# Patient Record
Sex: FEMALE | Race: WHITE | NOT HISPANIC OR LATINO | Employment: OTHER | URBAN - METROPOLITAN AREA
[De-identification: names, ages, dates, MRNs, and addresses within clinical notes are randomized per-mention and may not be internally consistent; named-entity substitution may affect disease eponyms.]

---

## 2017-01-27 ENCOUNTER — OFFICE VISIT - HEALTHEAST (OUTPATIENT)
Dept: GERIATRICS | Facility: CLINIC | Age: 82
End: 2017-01-27

## 2017-01-27 DIAGNOSIS — J18.9 CAP (COMMUNITY ACQUIRED PNEUMONIA): ICD-10-CM

## 2017-01-27 DIAGNOSIS — G93.40 ENCEPHALOPATHY: ICD-10-CM

## 2017-01-27 DIAGNOSIS — H91.93 HEARING LOSS, BILATERAL: ICD-10-CM

## 2017-01-27 DIAGNOSIS — I26.99 PULMONARY EMBOLISM AND INFARCTION (H): ICD-10-CM

## 2017-01-27 DIAGNOSIS — J10.1 INFLUENZA B: ICD-10-CM

## 2017-01-27 DIAGNOSIS — Q85.01: ICD-10-CM

## 2017-01-27 DIAGNOSIS — I26.99 PULMONARY INFARCT (H): ICD-10-CM

## 2017-01-27 DIAGNOSIS — I25.10 ATHEROSCLEROSIS OF NATIVE CORONARY ARTERY OF NATIVE HEART WITHOUT ANGINA PECTORIS: ICD-10-CM

## 2017-01-27 DIAGNOSIS — G40.209 PARTIAL SYMPTOMATIC EPILEPSY WITH COMPLEX PARTIAL SEIZURES, NOT INTRACTABLE, WITHOUT STATUS EPILEPTICUS (H): ICD-10-CM

## 2017-01-31 ENCOUNTER — OFFICE VISIT - HEALTHEAST (OUTPATIENT)
Dept: GERIATRICS | Facility: CLINIC | Age: 82
End: 2017-01-31

## 2017-01-31 DIAGNOSIS — J18.9 CAP (COMMUNITY ACQUIRED PNEUMONIA): ICD-10-CM

## 2017-01-31 DIAGNOSIS — I25.10 ATHEROSCLEROSIS OF NATIVE CORONARY ARTERY OF NATIVE HEART WITHOUT ANGINA PECTORIS: ICD-10-CM

## 2017-01-31 DIAGNOSIS — Q85.01: ICD-10-CM

## 2017-01-31 DIAGNOSIS — F03.90 DEMENTIA (H): ICD-10-CM

## 2017-01-31 DIAGNOSIS — G40.209 PARTIAL SYMPTOMATIC EPILEPSY WITH COMPLEX PARTIAL SEIZURES, NOT INTRACTABLE, WITHOUT STATUS EPILEPTICUS (H): ICD-10-CM

## 2017-01-31 DIAGNOSIS — J10.1 INFLUENZA B: ICD-10-CM

## 2017-01-31 DIAGNOSIS — I26.99 PULMONARY EMBOLISM AND INFARCTION (H): ICD-10-CM

## 2017-01-31 DIAGNOSIS — F03.90 DEMENTIA WITHOUT BEHAVIORAL DISTURBANCE, UNSPECIFIED DEMENTIA TYPE: ICD-10-CM

## 2017-01-31 DIAGNOSIS — R53.1 GENERALIZED WEAKNESS: ICD-10-CM

## 2017-01-31 DIAGNOSIS — I26.99 PULMONARY INFARCT (H): ICD-10-CM

## 2017-02-03 ENCOUNTER — OFFICE VISIT - HEALTHEAST (OUTPATIENT)
Dept: GERIATRICS | Facility: CLINIC | Age: 82
End: 2017-02-03

## 2017-02-03 DIAGNOSIS — I25.10 ATHEROSCLEROSIS OF NATIVE CORONARY ARTERY OF NATIVE HEART WITHOUT ANGINA PECTORIS: ICD-10-CM

## 2017-02-03 DIAGNOSIS — H91.93 HEARING LOSS, BILATERAL: ICD-10-CM

## 2017-02-03 DIAGNOSIS — J10.1 INFLUENZA B: ICD-10-CM

## 2017-02-03 DIAGNOSIS — C72.30 OPTIC NERVE GLIOMA (H): ICD-10-CM

## 2017-02-03 DIAGNOSIS — J18.9 CAP (COMMUNITY ACQUIRED PNEUMONIA): ICD-10-CM

## 2017-02-03 DIAGNOSIS — F03.90 DEMENTIA WITHOUT BEHAVIORAL DISTURBANCE, UNSPECIFIED DEMENTIA TYPE: ICD-10-CM

## 2017-02-03 DIAGNOSIS — N39.0 URINARY TRACT INFECTION: ICD-10-CM

## 2017-02-03 DIAGNOSIS — Q85.01: ICD-10-CM

## 2017-02-03 DIAGNOSIS — R53.1 GENERALIZED WEAKNESS: ICD-10-CM

## 2017-02-03 DIAGNOSIS — G40.209 PARTIAL SYMPTOMATIC EPILEPSY WITH COMPLEX PARTIAL SEIZURES, NOT INTRACTABLE, WITHOUT STATUS EPILEPTICUS (H): ICD-10-CM

## 2017-02-03 DIAGNOSIS — N39.0 URINARY TRACT INFECTION WITHOUT HEMATURIA, SITE UNSPECIFIED: ICD-10-CM

## 2017-02-03 DIAGNOSIS — I26.99 PULMONARY EMBOLISM AND INFARCTION (H): ICD-10-CM

## 2017-02-07 ENCOUNTER — OFFICE VISIT - HEALTHEAST (OUTPATIENT)
Dept: GERIATRICS | Facility: CLINIC | Age: 82
End: 2017-02-07

## 2017-02-07 DIAGNOSIS — G40.209 PARTIAL SYMPTOMATIC EPILEPSY WITH COMPLEX PARTIAL SEIZURES, NOT INTRACTABLE, WITHOUT STATUS EPILEPTICUS (H): ICD-10-CM

## 2017-02-07 DIAGNOSIS — J10.1 INFLUENZA B: ICD-10-CM

## 2017-02-07 DIAGNOSIS — N39.0 URINARY TRACT INFECTION WITHOUT HEMATURIA, SITE UNSPECIFIED: ICD-10-CM

## 2017-02-07 DIAGNOSIS — F03.90 DEMENTIA WITHOUT BEHAVIORAL DISTURBANCE, UNSPECIFIED DEMENTIA TYPE: ICD-10-CM

## 2017-02-07 DIAGNOSIS — I25.10 ATHEROSCLEROSIS OF NATIVE CORONARY ARTERY OF NATIVE HEART WITHOUT ANGINA PECTORIS: ICD-10-CM

## 2017-02-07 DIAGNOSIS — I26.99 PULMONARY EMBOLISM AND INFARCTION (H): ICD-10-CM

## 2017-02-07 DIAGNOSIS — R53.1 GENERALIZED WEAKNESS: ICD-10-CM

## 2017-02-07 DIAGNOSIS — Q85.01: ICD-10-CM

## 2017-02-07 DIAGNOSIS — J18.9 CAP (COMMUNITY ACQUIRED PNEUMONIA): ICD-10-CM

## 2017-02-09 ENCOUNTER — AMBULATORY - HEALTHEAST (OUTPATIENT)
Dept: GERIATRICS | Facility: CLINIC | Age: 82
End: 2017-02-09

## 2017-02-20 ENCOUNTER — HOME CARE/HOSPICE - HEALTHEAST (OUTPATIENT)
Dept: HOME HEALTH SERVICES | Facility: HOME HEALTH | Age: 82
End: 2017-02-20

## 2017-02-23 ENCOUNTER — HOME CARE/HOSPICE - HEALTHEAST (OUTPATIENT)
Dept: HOME HEALTH SERVICES | Facility: HOME HEALTH | Age: 82
End: 2017-02-23

## 2017-02-24 ENCOUNTER — HOME CARE/HOSPICE - HEALTHEAST (OUTPATIENT)
Dept: HOME HEALTH SERVICES | Facility: HOME HEALTH | Age: 82
End: 2017-02-24

## 2017-02-27 ENCOUNTER — HOME CARE/HOSPICE - HEALTHEAST (OUTPATIENT)
Dept: HOME HEALTH SERVICES | Facility: HOME HEALTH | Age: 82
End: 2017-02-27

## 2017-02-28 ENCOUNTER — HOME CARE/HOSPICE - HEALTHEAST (OUTPATIENT)
Dept: HOME HEALTH SERVICES | Facility: HOME HEALTH | Age: 82
End: 2017-02-28

## 2017-03-01 ENCOUNTER — HOME CARE/HOSPICE - HEALTHEAST (OUTPATIENT)
Dept: HOME HEALTH SERVICES | Facility: HOME HEALTH | Age: 82
End: 2017-03-01

## 2017-03-02 ENCOUNTER — HOME CARE/HOSPICE - HEALTHEAST (OUTPATIENT)
Dept: HOME HEALTH SERVICES | Facility: HOME HEALTH | Age: 82
End: 2017-03-02

## 2017-03-06 ENCOUNTER — HOME CARE/HOSPICE - HEALTHEAST (OUTPATIENT)
Dept: HOME HEALTH SERVICES | Facility: HOME HEALTH | Age: 82
End: 2017-03-06

## 2017-03-07 ENCOUNTER — HOME CARE/HOSPICE - HEALTHEAST (OUTPATIENT)
Dept: HOME HEALTH SERVICES | Facility: HOME HEALTH | Age: 82
End: 2017-03-07

## 2017-03-09 ENCOUNTER — HOME CARE/HOSPICE - HEALTHEAST (OUTPATIENT)
Dept: HOME HEALTH SERVICES | Facility: HOME HEALTH | Age: 82
End: 2017-03-09

## 2017-03-12 ENCOUNTER — COMMUNICATION - HEALTHEAST (OUTPATIENT)
Dept: CARDIOLOGY | Facility: CLINIC | Age: 82
End: 2017-03-12

## 2017-03-12 DIAGNOSIS — I25.10 CORONARY ATHEROSCLEROSIS OF NATIVE CORONARY ARTERY: ICD-10-CM

## 2017-03-13 ENCOUNTER — HOME CARE/HOSPICE - HEALTHEAST (OUTPATIENT)
Dept: HOME HEALTH SERVICES | Facility: HOME HEALTH | Age: 82
End: 2017-03-13

## 2017-03-14 ENCOUNTER — HOME CARE/HOSPICE - HEALTHEAST (OUTPATIENT)
Dept: HOME HEALTH SERVICES | Facility: HOME HEALTH | Age: 82
End: 2017-03-14

## 2017-03-15 ENCOUNTER — HOME CARE/HOSPICE - HEALTHEAST (OUTPATIENT)
Dept: HOME HEALTH SERVICES | Facility: HOME HEALTH | Age: 82
End: 2017-03-15

## 2017-03-16 ENCOUNTER — HOME CARE/HOSPICE - HEALTHEAST (OUTPATIENT)
Dept: HOME HEALTH SERVICES | Facility: HOME HEALTH | Age: 82
End: 2017-03-16

## 2017-03-19 ENCOUNTER — HOME CARE/HOSPICE - HEALTHEAST (OUTPATIENT)
Dept: HOME HEALTH SERVICES | Facility: HOME HEALTH | Age: 82
End: 2017-03-19

## 2017-03-20 ENCOUNTER — HOME CARE/HOSPICE - HEALTHEAST (OUTPATIENT)
Dept: HOME HEALTH SERVICES | Facility: HOME HEALTH | Age: 82
End: 2017-03-20

## 2017-03-23 ENCOUNTER — HOME CARE/HOSPICE - HEALTHEAST (OUTPATIENT)
Dept: HOME HEALTH SERVICES | Facility: HOME HEALTH | Age: 82
End: 2017-03-23

## 2017-04-13 ENCOUNTER — RECORDS - HEALTHEAST (OUTPATIENT)
Dept: ADMINISTRATIVE | Facility: OTHER | Age: 82
End: 2017-04-13

## 2017-04-24 ENCOUNTER — COMMUNICATION - HEALTHEAST (OUTPATIENT)
Dept: CARDIOLOGY | Facility: CLINIC | Age: 82
End: 2017-04-24

## 2017-04-24 DIAGNOSIS — E78.5 HYPERLIPIDEMIA: ICD-10-CM

## 2017-05-25 ENCOUNTER — COMMUNICATION - HEALTHEAST (OUTPATIENT)
Dept: ADMINISTRATIVE | Facility: CLINIC | Age: 82
End: 2017-05-25

## 2017-06-25 ENCOUNTER — COMMUNICATION - HEALTHEAST (OUTPATIENT)
Dept: CARDIOLOGY | Facility: CLINIC | Age: 82
End: 2017-06-25

## 2017-06-25 DIAGNOSIS — I25.10 CORONARY ATHEROSCLEROSIS OF NATIVE CORONARY ARTERY: ICD-10-CM

## 2017-08-28 ENCOUNTER — COMMUNICATION - HEALTHEAST (OUTPATIENT)
Dept: CARDIOLOGY | Facility: CLINIC | Age: 82
End: 2017-08-28

## 2017-08-28 DIAGNOSIS — E87.6 HYPOKALEMIA: ICD-10-CM

## 2017-08-29 ENCOUNTER — RECORDS - HEALTHEAST (OUTPATIENT)
Dept: ADMINISTRATIVE | Facility: OTHER | Age: 82
End: 2017-08-29

## 2017-09-05 ENCOUNTER — AMBULATORY - HEALTHEAST (OUTPATIENT)
Dept: CARDIOLOGY | Facility: CLINIC | Age: 82
End: 2017-09-05

## 2017-09-05 ENCOUNTER — OFFICE VISIT - HEALTHEAST (OUTPATIENT)
Dept: CARDIOLOGY | Facility: CLINIC | Age: 82
End: 2017-09-05

## 2017-09-05 DIAGNOSIS — I26.99 PULMONARY EMBOLUS AND INFARCTION (H): ICD-10-CM

## 2017-09-05 DIAGNOSIS — E78.5 DYSLIPIDEMIA: ICD-10-CM

## 2017-09-05 DIAGNOSIS — R06.00 DYSPNEA: ICD-10-CM

## 2017-09-05 DIAGNOSIS — I25.10 CAD (CORONARY ARTERY DISEASE): ICD-10-CM

## 2017-09-05 ASSESSMENT — MIFFLIN-ST. JEOR: SCORE: 1090.86

## 2017-09-06 ENCOUNTER — COMMUNICATION - HEALTHEAST (OUTPATIENT)
Dept: CARDIOLOGY | Facility: CLINIC | Age: 82
End: 2017-09-06

## 2017-09-06 ENCOUNTER — HOSPITAL ENCOUNTER (OUTPATIENT)
Dept: LAB | Age: 82
Setting detail: SPECIMEN
Discharge: HOME | End: 2017-09-06

## 2017-09-06 DIAGNOSIS — I25.10 CORONARY ATHEROSCLEROSIS OF NATIVE CORONARY ARTERY: ICD-10-CM

## 2017-09-07 ENCOUNTER — HOSPITAL ENCOUNTER (OUTPATIENT)
Dept: LAB | Age: 82
Setting detail: SPECIMEN
Discharge: HOME | End: 2017-09-07

## 2017-09-13 ENCOUNTER — AMBULATORY - HEALTHEAST (OUTPATIENT)
Dept: CARDIOLOGY | Facility: CLINIC | Age: 82
End: 2017-09-13

## 2017-09-15 ENCOUNTER — HOSPITAL ENCOUNTER (OUTPATIENT)
Dept: CARDIOLOGY | Facility: HOSPITAL | Age: 82
Discharge: HOME | End: 2017-09-15
Attending: INTERNAL MEDICINE

## 2017-09-15 DIAGNOSIS — I25.10 CAD (CORONARY ARTERY DISEASE): ICD-10-CM

## 2017-09-15 LAB
AORTIC ROOT: 2.6 CM
BSA FOR ECHO PROCEDURE: 1.76 M2
CV ECHO HEIGHT: 62 IN
CV ECHO WEIGHT: 156 LBS
DOP CALC LVOT AREA: 3.14 CM2
DOP CALC LVOT DIAMETER: 2 CM
DOP CALC LVOT STROKE VOLUME: 50.9 CM3
DOP CALCLVOT PEAK VEL VTI: 16.2 CM
EJECTION FRACTION: 58 % (ref 55–75)
FRACTIONAL SHORTENING: 27.9 % (ref 28–44)
INTERVENTRICULAR SEPTUM IN END DIASTOLE: 0.9 CM (ref 0.6–0.9)
IVS/PW RATIO: 1.1
LA AREA 1: 16.3 CM2
LA AREA 2: 13.8 CM2
LEFT ATRIUM LENGTH: 4.59 CM
LEFT ATRIUM SIZE: 3.9 CM
LEFT ATRIUM TO AORTIC ROOT RATIO: 1.5 NO UNITS
LEFT ATRIUM VOLUME INDEX: 23.7 ML/M2
LEFT ATRIUM VOLUME: 41.7 CM3
LEFT VENTRICLE DIASTOLIC VOLUME INDEX: 21.6 CM3/M2 (ref 34–74)
LEFT VENTRICLE DIASTOLIC VOLUME: 38 CM3 (ref 46–106)
LEFT VENTRICLE MASS INDEX: 64.9 G/M2
LEFT VENTRICLE SYSTOLIC VOLUME INDEX: 9.1 CM3/M2 (ref 11–31)
LEFT VENTRICLE SYSTOLIC VOLUME: 16 CM3 (ref 14–42)
LEFT VENTRICULAR INTERNAL DIMENSION IN DIASTOLE: 4.3 CM (ref 3.8–5.2)
LEFT VENTRICULAR INTERNAL DIMENSION IN SYSTOLE: 3.1 CM (ref 2.2–3.5)
LEFT VENTRICULAR MASS: 114.2 G
LEFT VENTRICULAR OUTFLOW TRACT MEAN GRADIENT: 1 MMHG
LEFT VENTRICULAR OUTFLOW TRACT MEAN VELOCITY: 44.8 CM/S
LEFT VENTRICULAR POSTERIOR WALL IN END DIASTOLE: 0.8 CM (ref 0.6–0.9)
LV STROKE VOLUME INDEX: 28.9 ML/M2
MITRAL REGURGITANT VELOCITY TIME INTEGRAL: 146 CM
MITRAL VALVE E/A RATIO: 1.1
MR FLOW: 16 CM3
MR MEAN GRADIENT: 47 MMHG
MR MEAN GRADIENT: 47 MMHG
MR MEAN VELOCITY: 311 CM/S
MR MEAN VELOCITY: 311 CM/S
MR PEAK GRADIENT: 98.8 MMHG
MR PISA EROA: 0.1 CM2
MR PISA RADIUS: 0.5 CM
MR PISA VN NYQUIST: 33.5 CM/S
MV AVERAGE E/E' RATIO: 11.3 CM/S
MV DECELERATION TIME: 197 MS
MV E'TISSUE VEL-LAT: 9.26 CM/S
MV E'TISSUE VEL-MED: 6.43 CM/S
MV LATERAL E/E' RATIO: 9.5
MV MEDIAL E/E' RATIO: 13.7
MV PEAK A VELOCITY: 81.7 CM/S
MV PEAK E VELOCITY: 88.3 CM/S
MV REGURGITANT VOLUME: 15.5 CC
NUC REST DIASTOLIC VOLUME INDEX: 2496 LBS
NUC REST SYSTOLIC VOLUME INDEX: 62 IN
PISA MR PEAK VEL: 497 CM/S
PR MAX PG: 4 MMHG
PR PEAK VELOCITY: 123 CM/S
TRICUSPID REGURGITATION PEAK PRESSURE GRADIENT: 25.2 MMHG
TRICUSPID VALVE PEAK REGURGITANT VELOCITY: 251 CM/S

## 2017-09-15 ASSESSMENT — MIFFLIN-ST. JEOR: SCORE: 1090.86

## 2017-10-02 ENCOUNTER — COMMUNICATION - HEALTHEAST (OUTPATIENT)
Dept: CARDIOLOGY | Facility: CLINIC | Age: 82
End: 2017-10-02

## 2017-10-02 DIAGNOSIS — I25.10 CORONARY ATHEROSCLEROSIS OF NATIVE CORONARY ARTERY: ICD-10-CM

## 2017-12-08 ENCOUNTER — COMMUNICATION - HEALTHEAST (OUTPATIENT)
Dept: CARDIOLOGY | Facility: CLINIC | Age: 82
End: 2017-12-08

## 2017-12-08 DIAGNOSIS — I25.10 CORONARY ATHEROSCLEROSIS OF NATIVE CORONARY ARTERY: ICD-10-CM

## 2017-12-18 ENCOUNTER — RECORDS - HEALTHEAST (OUTPATIENT)
Dept: LAB | Facility: HOSPITAL | Age: 82
End: 2017-12-18

## 2017-12-19 LAB — SYPHILIS RPR SCREEN - HISTORICAL: NORMAL

## 2018-01-12 ENCOUNTER — COMMUNICATION - HEALTHEAST (OUTPATIENT)
Dept: CARDIOLOGY | Facility: CLINIC | Age: 83
End: 2018-01-12

## 2018-01-12 DIAGNOSIS — E78.5 HYPERLIPIDEMIA: ICD-10-CM

## 2018-02-27 ENCOUNTER — RECORDS - HEALTHEAST (OUTPATIENT)
Dept: LAB | Facility: HOSPITAL | Age: 83
End: 2018-02-27

## 2018-02-27 LAB
ALBUMIN SERPL-MCNC: 3.7 G/DL (ref 3.5–5)
ALP SERPL-CCNC: 62 U/L (ref 45–120)
ALT SERPL W P-5'-P-CCNC: 21 U/L (ref 0–45)
AST SERPL W P-5'-P-CCNC: 24 U/L (ref 0–40)
BILIRUB DIRECT SERPL-MCNC: 0.3 MG/DL
BILIRUB SERPL-MCNC: 0.9 MG/DL (ref 0–1)
PROT SERPL-MCNC: 7 G/DL (ref 6–8)

## 2018-03-02 ENCOUNTER — COMMUNICATION - HEALTHEAST (OUTPATIENT)
Dept: CARDIOLOGY | Facility: CLINIC | Age: 83
End: 2018-03-02

## 2018-03-02 DIAGNOSIS — E87.6 HYPOKALEMIA: ICD-10-CM

## 2018-04-08 ENCOUNTER — RECORDS - HEALTHEAST (OUTPATIENT)
Dept: LAB | Facility: CLINIC | Age: 83
End: 2018-04-08

## 2018-04-11 LAB — BACTERIA SPEC CULT: ABNORMAL

## 2018-04-14 ENCOUNTER — COMMUNICATION - HEALTHEAST (OUTPATIENT)
Dept: CARDIOLOGY | Facility: CLINIC | Age: 83
End: 2018-04-14

## 2018-04-14 DIAGNOSIS — I25.10 CORONARY ATHEROSCLEROSIS OF NATIVE CORONARY ARTERY: ICD-10-CM

## 2018-04-17 ENCOUNTER — COMMUNICATION - HEALTHEAST (OUTPATIENT)
Dept: ADMINISTRATIVE | Facility: CLINIC | Age: 83
End: 2018-04-17

## 2018-05-22 ENCOUNTER — RECORDS - HEALTHEAST (OUTPATIENT)
Dept: LAB | Facility: HOSPITAL | Age: 83
End: 2018-05-22

## 2018-05-22 LAB
ALBUMIN SERPL-MCNC: 3.3 G/DL (ref 3.5–5)
ALP SERPL-CCNC: 54 U/L (ref 45–120)
ALT SERPL W P-5'-P-CCNC: 15 U/L (ref 0–45)
ANION GAP SERPL CALCULATED.3IONS-SCNC: 9 MMOL/L (ref 5–18)
AST SERPL W P-5'-P-CCNC: 18 U/L (ref 0–40)
BILIRUB DIRECT SERPL-MCNC: 0.3 MG/DL
BILIRUB SERPL-MCNC: 0.8 MG/DL (ref 0–1)
CHLORIDE BLD-SCNC: 110 MMOL/L (ref 98–107)
CO2 SERPL-SCNC: 23 MMOL/L (ref 22–31)
LEVETIRACETAM (KEPPRA): 22.3 UG/ML (ref 6–46)
POTASSIUM BLD-SCNC: 3.8 MMOL/L (ref 3.5–5)
PROT SERPL-MCNC: 6.5 G/DL (ref 6–8)
SODIUM SERPL-SCNC: 142 MMOL/L (ref 136–145)

## 2018-06-15 ENCOUNTER — COMMUNICATION - HEALTHEAST (OUTPATIENT)
Dept: CARDIOLOGY | Facility: CLINIC | Age: 83
End: 2018-06-15

## 2018-06-15 DIAGNOSIS — I25.10 CORONARY ATHEROSCLEROSIS OF NATIVE CORONARY ARTERY: ICD-10-CM

## 2018-07-19 ENCOUNTER — RECORDS - HEALTHEAST (OUTPATIENT)
Dept: LAB | Facility: CLINIC | Age: 83
End: 2018-07-19

## 2018-07-21 LAB — BACTERIA SPEC CULT: ABNORMAL

## 2018-07-27 ENCOUNTER — RECORDS - HEALTHEAST (OUTPATIENT)
Dept: ADMINISTRATIVE | Facility: OTHER | Age: 83
End: 2018-07-27

## 2018-07-27 ENCOUNTER — AMBULATORY - HEALTHEAST (OUTPATIENT)
Dept: CARDIOLOGY | Facility: CLINIC | Age: 83
End: 2018-07-27

## 2018-08-01 ENCOUNTER — OFFICE VISIT - HEALTHEAST (OUTPATIENT)
Dept: CARDIOLOGY | Facility: CLINIC | Age: 83
End: 2018-08-01

## 2018-08-01 ENCOUNTER — COMMUNICATION - HEALTHEAST (OUTPATIENT)
Dept: CARDIOLOGY | Facility: CLINIC | Age: 83
End: 2018-08-01

## 2018-08-01 DIAGNOSIS — I25.810 ATHEROSCLEROSIS OF CORONARY ARTERY BYPASS GRAFT OF NATIVE HEART WITHOUT ANGINA PECTORIS: ICD-10-CM

## 2018-08-01 DIAGNOSIS — F03.90 DEMENTIA WITHOUT BEHAVIORAL DISTURBANCE, UNSPECIFIED DEMENTIA TYPE: ICD-10-CM

## 2018-08-01 DIAGNOSIS — Q85.01: ICD-10-CM

## 2018-08-01 DIAGNOSIS — E78.5 DYSLIPIDEMIA: ICD-10-CM

## 2018-08-01 DIAGNOSIS — I25.10 CAD (CORONARY ARTERY DISEASE): ICD-10-CM

## 2018-08-01 DIAGNOSIS — I25.10 CORONARY ATHEROSCLEROSIS OF NATIVE CORONARY ARTERY: ICD-10-CM

## 2018-08-01 DIAGNOSIS — I26.99 PULMONARY EMBOLISM AND INFARCTION (H): ICD-10-CM

## 2018-08-01 LAB
ANION GAP SERPL CALCULATED.3IONS-SCNC: 6 MMOL/L (ref 5–18)
BUN SERPL-MCNC: 14 MG/DL (ref 8–28)
CALCIUM SERPL-MCNC: 9.6 MG/DL (ref 8.5–10.5)
CHLORIDE BLD-SCNC: 109 MMOL/L (ref 98–107)
CHOLEST SERPL-MCNC: 132 MG/DL
CO2 SERPL-SCNC: 26 MMOL/L (ref 22–31)
CREAT SERPL-MCNC: 0.62 MG/DL (ref 0.6–1.1)
FASTING STATUS PATIENT QL REPORTED: YES
GFR SERPL CREATININE-BSD FRML MDRD: >60 ML/MIN/1.73M2
GLUCOSE BLD-MCNC: 92 MG/DL (ref 70–125)
HDLC SERPL-MCNC: 34 MG/DL
LDLC SERPL CALC-MCNC: 70 MG/DL
POTASSIUM BLD-SCNC: 3.6 MMOL/L (ref 3.5–5)
SODIUM SERPL-SCNC: 141 MMOL/L (ref 136–145)
TRIGL SERPL-MCNC: 142 MG/DL

## 2018-08-01 ASSESSMENT — MIFFLIN-ST. JEOR: SCORE: 991.07

## 2018-08-02 ENCOUNTER — COMMUNICATION - HEALTHEAST (OUTPATIENT)
Dept: CARDIOLOGY | Facility: CLINIC | Age: 83
End: 2018-08-02

## 2018-09-09 ENCOUNTER — COMMUNICATION - HEALTHEAST (OUTPATIENT)
Dept: CARDIOLOGY | Facility: CLINIC | Age: 83
End: 2018-09-09

## 2018-09-09 DIAGNOSIS — E87.6 HYPOKALEMIA: ICD-10-CM

## 2018-10-17 ENCOUNTER — COMMUNICATION - HEALTHEAST (OUTPATIENT)
Dept: CARDIOLOGY | Facility: CLINIC | Age: 83
End: 2018-10-17

## 2018-10-17 DIAGNOSIS — E78.5 HYPERLIPIDEMIA: ICD-10-CM

## 2018-10-29 ENCOUNTER — COMMUNICATION - HEALTHEAST (OUTPATIENT)
Dept: CARDIOLOGY | Facility: CLINIC | Age: 83
End: 2018-10-29

## 2018-10-29 DIAGNOSIS — I25.10 CORONARY ATHEROSCLEROSIS OF NATIVE CORONARY ARTERY: ICD-10-CM

## 2018-11-08 ENCOUNTER — COMMUNICATION - HEALTHEAST (OUTPATIENT)
Dept: CARDIOLOGY | Facility: CLINIC | Age: 83
End: 2018-11-08

## 2018-11-08 DIAGNOSIS — I25.10 CORONARY ATHEROSCLEROSIS OF NATIVE CORONARY ARTERY: ICD-10-CM

## 2019-01-01 ENCOUNTER — COMMUNICATION - HEALTHEAST (OUTPATIENT)
Dept: CARDIOLOGY | Facility: CLINIC | Age: 84
End: 2019-01-01

## 2019-01-01 DIAGNOSIS — I25.10 CORONARY ATHEROSCLEROSIS OF NATIVE CORONARY ARTERY: ICD-10-CM

## 2019-03-08 ENCOUNTER — RECORDS - HEALTHEAST (OUTPATIENT)
Dept: ADMINISTRATIVE | Facility: OTHER | Age: 84
End: 2019-03-08

## 2019-03-14 ENCOUNTER — HOSPITAL ENCOUNTER (OUTPATIENT)
Dept: LAB | Age: 84
Setting detail: SPECIMEN
Discharge: HOME | End: 2019-03-14

## 2019-03-14 ENCOUNTER — OFFICE VISIT - HEALTHEAST (OUTPATIENT)
Dept: CARDIOLOGY | Facility: CLINIC | Age: 84
End: 2019-03-14

## 2019-03-14 DIAGNOSIS — R53.83 FATIGUE: ICD-10-CM

## 2019-03-14 DIAGNOSIS — I25.10 CORONARY ATHEROSCLEROSIS OF NATIVE CORONARY ARTERY: ICD-10-CM

## 2019-03-14 DIAGNOSIS — F03.90 DEMENTIA (H): ICD-10-CM

## 2019-03-14 LAB
ANION GAP SERPL CALCULATED.3IONS-SCNC: 9 MMOL/L (ref 5–18)
BUN SERPL-MCNC: 14 MG/DL (ref 8–28)
CALCIUM SERPL-MCNC: 9.8 MG/DL (ref 8.5–10.5)
CHLORIDE BLD-SCNC: 106 MMOL/L (ref 98–107)
CO2 SERPL-SCNC: 25 MMOL/L (ref 22–31)
CREAT SERPL-MCNC: 0.69 MG/DL (ref 0.6–1.1)
GFR SERPL CREATININE-BSD FRML MDRD: >60 ML/MIN/1.73M2
GLUCOSE BLD-MCNC: 111 MG/DL (ref 70–125)
POTASSIUM BLD-SCNC: 3.7 MMOL/L (ref 3.5–5)
SODIUM SERPL-SCNC: 140 MMOL/L (ref 136–145)
TSH SERPL DL<=0.005 MIU/L-ACNC: 2.6 UIU/ML (ref 0.3–5)

## 2019-03-14 ASSESSMENT — MIFFLIN-ST. JEOR: SCORE: 918.49

## 2019-03-15 ENCOUNTER — COMMUNICATION - HEALTHEAST (OUTPATIENT)
Dept: CARDIOLOGY | Facility: CLINIC | Age: 84
End: 2019-03-15

## 2019-03-16 ENCOUNTER — COMMUNICATION - HEALTHEAST (OUTPATIENT)
Dept: CARDIOLOGY | Facility: CLINIC | Age: 84
End: 2019-03-16

## 2019-03-16 DIAGNOSIS — I25.10 CORONARY ATHEROSCLEROSIS OF NATIVE CORONARY ARTERY: ICD-10-CM

## 2019-03-27 ENCOUNTER — COMMUNICATION - HEALTHEAST (OUTPATIENT)
Dept: CARDIOLOGY | Facility: CLINIC | Age: 84
End: 2019-03-27

## 2019-03-27 DIAGNOSIS — I25.10 CORONARY ATHEROSCLEROSIS OF NATIVE CORONARY ARTERY: ICD-10-CM

## 2019-05-17 ENCOUNTER — RECORDS - HEALTHEAST (OUTPATIENT)
Dept: ADMINISTRATIVE | Facility: OTHER | Age: 84
End: 2019-05-17

## 2019-06-26 ENCOUNTER — COMMUNICATION - HEALTHEAST (OUTPATIENT)
Dept: CARDIOLOGY | Facility: CLINIC | Age: 84
End: 2019-06-26

## 2019-06-26 DIAGNOSIS — E87.6 HYPOKALEMIA: ICD-10-CM

## 2019-07-23 ENCOUNTER — COMMUNICATION - HEALTHEAST (OUTPATIENT)
Dept: CARDIOLOGY | Facility: CLINIC | Age: 84
End: 2019-07-23

## 2019-07-23 DIAGNOSIS — E78.5 HYPERLIPIDEMIA: ICD-10-CM

## 2019-09-17 ENCOUNTER — COMMUNICATION - HEALTHEAST (OUTPATIENT)
Dept: CARDIOLOGY | Facility: CLINIC | Age: 84
End: 2019-09-17

## 2019-09-17 DIAGNOSIS — I25.10 CORONARY ATHEROSCLEROSIS OF NATIVE CORONARY ARTERY: ICD-10-CM

## 2019-11-07 ENCOUNTER — COMMUNICATION - HEALTHEAST (OUTPATIENT)
Dept: CARDIOLOGY | Facility: CLINIC | Age: 84
End: 2019-11-07

## 2019-11-07 DIAGNOSIS — I25.10 CORONARY ATHEROSCLEROSIS OF NATIVE CORONARY ARTERY: ICD-10-CM

## 2020-01-23 ENCOUNTER — COMMUNICATION - HEALTHEAST (OUTPATIENT)
Dept: CARDIOLOGY | Facility: CLINIC | Age: 85
End: 2020-01-23

## 2020-01-23 DIAGNOSIS — E78.5 HYPERLIPIDEMIA: ICD-10-CM

## 2020-02-04 ENCOUNTER — RECORDS - HEALTHEAST (OUTPATIENT)
Dept: LAB | Facility: HOSPITAL | Age: 85
End: 2020-02-04

## 2020-02-04 LAB
ALBUMIN SERPL-MCNC: 3.4 G/DL (ref 3.5–5)
ALP SERPL-CCNC: 56 U/L (ref 45–120)
ALT SERPL W P-5'-P-CCNC: 16 U/L (ref 0–45)
ANION GAP SERPL CALCULATED.3IONS-SCNC: 8 MMOL/L (ref 5–18)
AST SERPL W P-5'-P-CCNC: 19 U/L (ref 0–40)
BILIRUB SERPL-MCNC: 0.8 MG/DL (ref 0–1)
BUN SERPL-MCNC: 13 MG/DL (ref 8–28)
CALCIUM SERPL-MCNC: 9.7 MG/DL (ref 8.5–10.5)
CHLORIDE BLD-SCNC: 111 MMOL/L (ref 98–107)
CO2 SERPL-SCNC: 24 MMOL/L (ref 22–31)
CREAT SERPL-MCNC: 0.61 MG/DL (ref 0.6–1.1)
GFR SERPL CREATININE-BSD FRML MDRD: >60 ML/MIN/1.73M2
GLUCOSE BLD-MCNC: 89 MG/DL (ref 70–125)
LEVETIRACETAM (KEPPRA): 2.1 UG/ML (ref 6–46)
POTASSIUM BLD-SCNC: 3.9 MMOL/L (ref 3.5–5)
PROT SERPL-MCNC: 6.6 G/DL (ref 6–8)
SODIUM SERPL-SCNC: 143 MMOL/L (ref 136–145)

## 2020-02-18 ENCOUNTER — COMMUNICATION - HEALTHEAST (OUTPATIENT)
Dept: CARDIOLOGY | Facility: CLINIC | Age: 85
End: 2020-02-18

## 2020-02-18 DIAGNOSIS — I25.10 CORONARY ATHEROSCLEROSIS OF NATIVE CORONARY ARTERY: ICD-10-CM

## 2020-03-25 ENCOUNTER — COMMUNICATION - HEALTHEAST (OUTPATIENT)
Dept: CARDIOLOGY | Facility: CLINIC | Age: 85
End: 2020-03-25

## 2020-03-25 DIAGNOSIS — I25.10 CORONARY ATHEROSCLEROSIS OF NATIVE CORONARY ARTERY: ICD-10-CM

## 2020-03-26 ENCOUNTER — AMBULATORY - HEALTHEAST (OUTPATIENT)
Dept: CARDIOLOGY | Facility: CLINIC | Age: 85
End: 2020-03-26

## 2020-03-26 ENCOUNTER — COMMUNICATION - HEALTHEAST (OUTPATIENT)
Dept: CARDIOLOGY | Facility: CLINIC | Age: 85
End: 2020-03-26

## 2020-03-26 ENCOUNTER — RECORDS - HEALTHEAST (OUTPATIENT)
Dept: ADMINISTRATIVE | Facility: OTHER | Age: 85
End: 2020-03-26

## 2020-03-26 DIAGNOSIS — E87.6 HYPOKALEMIA: ICD-10-CM

## 2020-03-26 DIAGNOSIS — I25.10 CORONARY ATHEROSCLEROSIS OF NATIVE CORONARY ARTERY: ICD-10-CM

## 2020-07-02 ENCOUNTER — OFFICE VISIT - HEALTHEAST (OUTPATIENT)
Dept: CARDIOLOGY | Facility: CLINIC | Age: 85
End: 2020-07-02

## 2020-07-02 DIAGNOSIS — E78.5 HYPERLIPIDEMIA: ICD-10-CM

## 2020-07-02 DIAGNOSIS — I25.10 CORONARY ATHEROSCLEROSIS OF NATIVE CORONARY ARTERY: ICD-10-CM

## 2020-07-02 DIAGNOSIS — E78.5 DYSLIPIDEMIA: ICD-10-CM

## 2020-07-02 DIAGNOSIS — I25.10 ATHEROSCLEROSIS OF NATIVE CORONARY ARTERY OF NATIVE HEART WITHOUT ANGINA PECTORIS: ICD-10-CM

## 2020-08-09 ENCOUNTER — COMMUNICATION - HEALTHEAST (OUTPATIENT)
Dept: CARDIOLOGY | Facility: CLINIC | Age: 85
End: 2020-08-09

## 2020-08-09 DIAGNOSIS — E87.6 HYPOKALEMIA: ICD-10-CM

## 2020-08-31 ENCOUNTER — COMMUNICATION - HEALTHEAST (OUTPATIENT)
Dept: CARDIOLOGY | Facility: CLINIC | Age: 85
End: 2020-08-31

## 2020-08-31 DIAGNOSIS — I25.10 CORONARY ATHEROSCLEROSIS OF NATIVE CORONARY ARTERY: ICD-10-CM

## 2020-10-20 ENCOUNTER — COMMUNICATION - HEALTHEAST (OUTPATIENT)
Dept: CARDIOLOGY | Facility: CLINIC | Age: 85
End: 2020-10-20

## 2020-10-20 DIAGNOSIS — I25.10 CORONARY ATHEROSCLEROSIS OF NATIVE CORONARY ARTERY: ICD-10-CM

## 2021-01-07 PROBLEM — Q85.01 TYPE 1 NEUROFIBROMATOSIS (H): Status: ACTIVE | Noted: 2021-01-07

## 2021-01-07 PROBLEM — E78.5 HYPERLIPIDEMIA: Status: ACTIVE | Noted: 2021-01-07

## 2021-01-07 PROBLEM — G40.209 EPILEPSY WITH PARTIAL COMPLEX SEIZURES (H): Status: ACTIVE | Noted: 2021-01-07

## 2021-01-07 PROBLEM — C72.30: Status: ACTIVE | Noted: 2021-01-07

## 2021-01-07 PROBLEM — G30.1 LATE ONSET ALZHEIMER DISEASE (H): Status: ACTIVE | Noted: 2021-01-07

## 2021-01-07 PROBLEM — H90.5 SENSORINEURAL HEARING LOSS (SNHL): Status: ACTIVE | Noted: 2021-01-07

## 2021-01-07 PROBLEM — F02.80 LATE ONSET ALZHEIMER DISEASE (H): Status: ACTIVE | Noted: 2021-01-07

## 2021-01-07 PROBLEM — Q10.7: Status: ACTIVE | Noted: 2021-01-07

## 2021-01-07 SDOH — HEALTH STABILITY: MENTAL HEALTH: HOW MANY STANDARD DRINKS CONTAINING ALCOHOL DO YOU HAVE ON A TYPICAL DAY?: NOT ASKED

## 2021-01-07 SDOH — HEALTH STABILITY: MENTAL HEALTH: HOW OFTEN DO YOU HAVE 6 OR MORE DRINKS ON ONE OCCASION?: NOT ASKED

## 2021-01-07 SDOH — HEALTH STABILITY: MENTAL HEALTH: HOW OFTEN DO YOU HAVE A DRINK CONTAINING ALCOHOL?: NOT ASKED

## 2021-01-13 ENCOUNTER — VIRTUAL VISIT (OUTPATIENT)
Dept: NEUROLOGY | Facility: CLINIC | Age: 86
End: 2021-01-13
Payer: MEDICARE

## 2021-01-13 ENCOUNTER — RECORDS - HEALTHEAST (OUTPATIENT)
Dept: LAB | Facility: HOSPITAL | Age: 86
End: 2021-01-13

## 2021-01-13 VITALS
BODY MASS INDEX: 21.71 KG/M2 | DIASTOLIC BLOOD PRESSURE: 70 MMHG | HEIGHT: 62 IN | WEIGHT: 118 LBS | SYSTOLIC BLOOD PRESSURE: 144 MMHG

## 2021-01-13 DIAGNOSIS — F02.80 LATE ONSET ALZHEIMER'S DISEASE WITHOUT BEHAVIORAL DISTURBANCE (H): Primary | ICD-10-CM

## 2021-01-13 DIAGNOSIS — G30.1 LATE ONSET ALZHEIMER'S DISEASE WITHOUT BEHAVIORAL DISTURBANCE (H): Primary | ICD-10-CM

## 2021-01-13 DIAGNOSIS — G40.209 PARTIAL SYMPTOMATIC EPILEPSY WITH COMPLEX PARTIAL SEIZURES, NOT INTRACTABLE, WITHOUT STATUS EPILEPTICUS (H): ICD-10-CM

## 2021-01-13 PROBLEM — I25.10 CORONARY ATHEROSCLEROSIS: Status: ACTIVE | Noted: 2021-01-13

## 2021-01-13 PROBLEM — E78.5 DYSLIPIDEMIA: Status: ACTIVE | Noted: 2021-01-13

## 2021-01-13 PROBLEM — I26.99 PULMONARY EMBOLISM AND INFARCTION (H): Status: ACTIVE | Noted: 2017-01-18

## 2021-01-13 LAB
ALBUMIN SERPL-MCNC: 3.2 G/DL (ref 3.5–5)
ALP SERPL-CCNC: 67 U/L (ref 45–120)
ALT SERPL W P-5'-P-CCNC: 16 U/L (ref 0–45)
ANION GAP SERPL CALCULATED.3IONS-SCNC: 12 MMOL/L (ref 5–18)
AST SERPL W P-5'-P-CCNC: 23 U/L (ref 0–40)
BILIRUB SERPL-MCNC: 0.5 MG/DL (ref 0–1)
BUN SERPL-MCNC: 18 MG/DL (ref 8–28)
CALCIUM SERPL-MCNC: 9.1 MG/DL (ref 8.5–10.5)
CHLORIDE BLD-SCNC: 109 MMOL/L (ref 98–107)
CO2 SERPL-SCNC: 22 MMOL/L (ref 22–31)
CREAT SERPL-MCNC: 0.63 MG/DL (ref 0.6–1.1)
GFR SERPL CREATININE-BSD FRML MDRD: >60 ML/MIN/1.73M2
GLUCOSE BLD-MCNC: 70 MG/DL (ref 70–125)
LEVETIRACETAM (KEPPRA): 56.3 UG/ML (ref 6–46)
POTASSIUM BLD-SCNC: 4.2 MMOL/L (ref 3.5–5)
PROT SERPL-MCNC: 6.7 G/DL (ref 6–8)
SODIUM SERPL-SCNC: 143 MMOL/L (ref 136–145)

## 2021-01-13 PROCEDURE — 99443 PR PHYSICIAN TELEPHONE EVALUATION 21-30 MIN: CPT | Performed by: PSYCHIATRY & NEUROLOGY

## 2021-01-13 RX ORDER — ROSUVASTATIN CALCIUM 40 MG/1
TABLET, COATED ORAL
COMMUNITY
Start: 2020-10-27 | End: 2021-07-26

## 2021-01-13 RX ORDER — METOPROLOL TARTRATE 25 MG/1
25 TABLET, FILM COATED ORAL DAILY
COMMUNITY
Start: 2020-10-27 | End: 2022-01-01

## 2021-01-13 RX ORDER — LEVETIRACETAM 100 MG/ML
750 SOLUTION ORAL 2 TIMES DAILY
Qty: 450 ML | Refills: 11 | Status: SHIPPED | OUTPATIENT
Start: 2021-01-13 | End: 2021-01-16

## 2021-01-13 RX ORDER — POTASSIUM CHLORIDE 750 MG/1
TABLET, EXTENDED RELEASE ORAL
COMMUNITY
Start: 2020-11-17 | End: 2021-11-08

## 2021-01-13 RX ORDER — DONEPEZIL HYDROCHLORIDE 10 MG/1
10 TABLET, FILM COATED ORAL DAILY
Qty: 30 TABLET | Refills: 11 | Status: SHIPPED | OUTPATIENT
Start: 2021-01-13 | End: 2022-01-01

## 2021-01-13 RX ORDER — LEVETIRACETAM 100 MG/ML
7.5 SOLUTION ORAL 2 TIMES DAILY
COMMUNITY
Start: 2020-12-08 | End: 2021-01-13

## 2021-01-13 RX ORDER — ISOSORBIDE MONONITRATE 30 MG/1
TABLET, EXTENDED RELEASE ORAL
COMMUNITY
Start: 2020-12-08 | End: 2022-01-01

## 2021-01-13 RX ORDER — ASPIRIN 81 MG
1 TABLET, DELAYED RELEASE (ENTERIC COATED) ORAL
COMMUNITY
End: 2022-01-01

## 2021-01-13 RX ORDER — FUROSEMIDE 40 MG
TABLET ORAL
COMMUNITY
Start: 2020-10-09 | End: 2021-09-13

## 2021-01-13 RX ORDER — DONEPEZIL HYDROCHLORIDE 10 MG/1
10 TABLET, FILM COATED ORAL DAILY
COMMUNITY
Start: 2021-01-07 | End: 2021-01-13

## 2021-01-13 ASSESSMENT — MIFFLIN-ST. JEOR: SCORE: 918.49

## 2021-01-13 NOTE — PROGRESS NOTES
NEUROLOGY FOLLOW UP VISIT  NOTE       [facility] NEUROLOGY [place]  [address] [place] [address]  Tel: [phone]  Fax: [phone]  [email or web address]     David Sutton,  1932, MRN [id number]  PCP: Leslie Loaiza, [phone]  Date: 2021     ASSESSMENT & PLAN     Diagnosis code  1.  Late onset Alzheimer's disease without behavioral disturbance (H)  2. 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  Unfortunately this far out no meaningful intervention can be offered and I have asked her to continue on Aricept 10 mg daily.  I am checking hepatic profile.  I refilled her prescription for Aricept gave her 30-day supply with 11 refills.    Complex partial seizure (dysrhythmia grade III left temporal)  Patient has long history of complex partial seizures well-controlled on Keppra.  Work-up in the past included EEG that showed diffuse slowing with occasional left temporal sharp activity.  She has been on Keppra with good control of her seizures.  Regular follow-up will be in 1 year.      Thank you again for this referral, please feel free to contact me if you have any questions.    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  According to son there is no  "change in her dementia.  She continues to require constant care.  She does not recognize him.  Patient also carries a diagnosis of seizures that are well controlled on current dose of Keppra.  He denies any seizures.  He does report that close to [place] there was sudden decline in appetite.  Her memory fluctuates at times she is able to recognize her both sons but at times she is confused.  Previously she would repeatedly say \"or no\" and would not be able to verbalize what it meant.  Son reports that she is not seeing it as often.     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    Surgical History:  She  has a past surgical history that includes strabismus surgery; h/o heart surgery (2006); Mastectomy (Left, 2013); Mastectomy (Right, 1995); rotator cuff repair rt/lt (Right, 2000); and rotator cuff repair rt/lt (Left, 2003).     SOCIAL HISTORY     Reviewed, and she  reports that she has never smoked. She has never used smokeless tobacco. She reports previous alcohol use.     FAMILY HISTORY     Reviewed, and family history includes Alzheimer Disease in her mother; Cancer in her father and sister; Coronary Artery Disease in her mother; Glaucoma in her mother.     ALLERGIES " "    Allergies   Allergen Reactions     Clarithromycin Swelling     Neck Swelling           REVIEW OF SYSTEMS     A 12 point review of system was performed and was negative except as outlined in the history of present illness.     HOME MEDICATIONS       Current Outpatient Medications:      aspirin 81 MG tablet, Take by mouth daily, Disp: , Rfl:      donepezil (ARICEPT) 10 MG tablet, Take 10 mg by mouth daily, Disp: , Rfl:      ibuprofen (ADVIL,MOTRIN) 800 MG tablet, Take 800 mg by mouth daily, Disp: , Rfl:      levETIRAcetam (KEPPRA) 100 MG/ML solution, Take 7.5 mLs by mouth 2 times daily, Disp: , Rfl:      METOPROLOL TARTRATE PO, , Disp: , Rfl:      ofloxacin (OCUFLOX) 0.3 % ophthalmic solution, Starting the night prior to surgery, use 1 drop at 8 PM, 1 drop at 9 PM, and 1 drop at 10 PM.  Starting the day after surgery, to continue 1 drop three times daily, Disp: 1 Bottle, Rfl: 1     POTASSIUM CHLORIDE ER PO, , Disp: , Rfl:      prednisoLONE acetate (PRED FORTE) 1 % ophthalmic suspension, Operated eye  Start the day after surgery 1 drop four times a day for 1 week, then three times a day for 1 week, then twice a day for 1 week, then once a day for 1 week, then stop, Disp: 1 Bottle, Rfl: 1     Rosuvastatin Calcium (CRESTOR PO), , Disp: , Rfl:      TAMOXIFEN CITRATE PO, , Disp: , Rfl:       PHYSICAL EXAM     Vital signs  BP (!) 144/70   Ht 1.575 m (5' 2\")   Wt 53.5 kg (118 lb)   BMI 21.58 kg/m      Weight:   118 lbs 0 oz    Patient is alert and oriented x1 patient is hard of hearing.  Most of the visit was done with the help of the son rest of the exam was not possible on phone visit     DIAGNOSTIC STUDIES     PERTINENT RADIOLOGY  Following imaging studies were reviewed:     (01/04/2018 11:24 CST BRAIN PET Order)  Pattern of brain hypometabolism compatible with Alzheimer's dementia    MRI HEAD & ORBITS 7/14/2016  HEAD MRI:  1.  No acute infarct or intracranial hemorrhage.  2.  Sequela of remote ischemic insult to " the right cerebellar hemisphere.  3.  Mild to moderate age-related changes.     ORBIT MRI:  1.  Lobular enhancing structures within the left orbit correlate to findings on unenhanced head CT examination dated 07/13/2016. Noted lesion extension into the left orbital apex demonstrates contiguity with multispacial enhancing soft tissue mass   expanding the left pterygopalatine fossa, left sphenopalatine foramen, and retroantral fat pad. Signal characteristics are compatible with a venous vascular malformation and multiple orbital varices.  Findings are likely congenital , with   hypopneumatization of the left sphenoid sinus. Correlation with intermittent, reversible proptosis elicited by Valsalva maneuver or head positioning is recommended. MRA examination of the head could be performed to exclude high flow arteriovenous   component as clinically warranted.  2.  Tortuosity and fusiform expansion of the intraorbital and intracanalicular segments of the right optic nerve, with nonenhancing T2 prolongation. Findings are compatible with known optic pathway glioma.    MRI BRAIN 1/19/2017  1. The exam is considerably degraded by motion.  2. No acute infarcts, intra-axial mass lesions or hemorrhage.  3. Age-related changes as detailed above.  4. The patient's known optic nerve gliomas within the orbits are suboptimally seen due to motion.    EEG 7/14/2016  This is abnormal EEG due to diffusely slow background in theta and delta range that suggests nonspecific generalized cerebral dysfunction.  In addition there is rare left temporal sharp activity that suggests low threshold for focal seizures.     PERTINENT LABS  Following labs were reviewed:  Result Name Current Result Reference Range   Sodium Level (mmol/L)  143 2/4/2020 136 - 145   Potassium Level (mmol/L)  3.9 2/4/2020 3.5 - 5.0   Chloride Level (mmol/L) ((H)) 111 2/4/2020 98 - 107   CO2 Level (mmol/L)  24 2/4/2020 22 - 31   AGAP (mmol/L)  8 2/4/2020 5 - 18   Glucose  Level (mg/dL)  89 2/4/2020 70 - 125   BUN (mg/dL)  13 2/4/2020 8 - 28   Creatinine Level (mg/dL)  0.61 2/4/2020 0.60 - 1.10   eGFR  (mL/min/1.73m2)  >60 2/4/2020 >60 -    eGFR (mL/min/1.73m2)  >60 2/4/2020 >60 -    Bilirubin Total (mg/dL)  0.8 2/4/2020 0.0 - 1.0   Calcium Level (mg/dL)  9.7 2/4/2020 8.5 - 10.5   Protein Total (gm/dL)  6.6 2/4/2020 6.0 - 8.0   Albumin Level (gm/dL) ((L)) 3.4 2/4/2020 3.5 - 5.0   Alkaline Phosphatase (U/L)  56 2/4/2020 45 - 120   AST/SGOT (U/L)  19 2/4/2020 0 - 40   ALT/SGPT (U/L)  16 2/4/2020 0 - 45        TELEPHONE VISIT CONSENT   This telephone visit was done in lieu of a face to face visit due to COVID 19 pandemic.   Total Time: visit 30 minutes            Total time spent for face to face visit, reviewing labs/imaging studies, counseling and coordination of care was: 30 Minutes spent on the date of the encounter doing chart review, review of test results, patient visit, documentation and discussion with family       This note was dictated using voice recognition software.  Any grammatical or context distortions are unintentional and inherent to the software.

## 2021-01-13 NOTE — NURSING NOTE
Chief Complaint   Patient presents with     Dementia     Annual follow up     Phone visit     Call [phone]     Mary Beth Mccabe CMA on 1/13/2021 at 10:26 AM

## 2021-01-13 NOTE — LETTER
2021         RE: David Sutton   [address]  [facility] [address]        Dear Colleague,    Thank you for referring your patient, David Sutton, to the [facility] NEUROLOGY [facility] [place]. Please see a copy of my visit note below.    NEUROLOGY FOLLOW UP VISIT  NOTE       [facility] NEUROLOGY [place]  [address] [place] [address]  Tel: [phone]  Fax: [phone]  [email or web address]     David Sutton,  1932, MRN [id number]  PCP: Leslie Loaiza, [phone]  Date: 2021     ASSESSMENT & PLAN     Diagnosis code  1.  Late onset Alzheimer's disease without behavioral disturbance (H)  2. 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  Unfortunately this far out no meaningful intervention can be offered and I have asked her to continue on Aricept 10 mg daily.  I am checking hepatic profile.  I refilled her prescription for Aricept gave her 30-day supply with 11 refills.    Complex partial seizure (dysrhythmia grade III left temporal)  Patient has long history of complex partial seizures well-controlled on Keppra.  Work-up in the past included EEG that showed diffuse slowing with occasional left temporal sharp activity.  She has been on Keppra with good control of her seizures.  Regular follow-up will be in 1 year.      Thank you again for this referral, please feel free to contact me if you have any questions.    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  According to son there is no change in her dementia.  She continues to require constant care.  She does not recognize him.  Patient also carries a diagnosis of seizures that are well controlled on current dose of Keppra.  He denies any seizures.  He does report that close to [place] there was sudden decline in appetite.  Her memory fluctuates at times she is able to recognize her both sons but at times she is confused.  Previously she would repeatedly say \"or no\" and would not be able to verbalize what it meant.  Son reports that she is not seeing it as often.     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    Surgical History:  She  has a past surgical history that includes strabismus surgery; h/o heart surgery (2006); Mastectomy (Left, 2013); Mastectomy (Right, 1995); rotator cuff repair rt/lt (Right, 2000); and rotator cuff repair rt/lt (Left, 2003).     SOCIAL HISTORY     Reviewed, and she  reports that she has never smoked. She has never used " "smokeless tobacco. She reports previous alcohol use.     FAMILY HISTORY     Reviewed, and family history includes Alzheimer Disease in her mother; Cancer in her father and sister; Coronary Artery Disease in her mother; Glaucoma in her mother.     ALLERGIES     Allergies   Allergen Reactions     Clarithromycin Swelling     Neck Swelling           REVIEW OF SYSTEMS     A 12 point review of system was performed and was negative except as outlined in the history of present illness.     HOME MEDICATIONS       Current Outpatient Medications:      aspirin 81 MG tablet, Take by mouth daily, Disp: , Rfl:      donepezil (ARICEPT) 10 MG tablet, Take 10 mg by mouth daily, Disp: , Rfl:      ibuprofen (ADVIL,MOTRIN) 800 MG tablet, Take 800 mg by mouth daily, Disp: , Rfl:      levETIRAcetam (KEPPRA) 100 MG/ML solution, Take 7.5 mLs by mouth 2 times daily, Disp: , Rfl:      METOPROLOL TARTRATE PO, , Disp: , Rfl:      ofloxacin (OCUFLOX) 0.3 % ophthalmic solution, Starting the night prior to surgery, use 1 drop at 8 PM, 1 drop at 9 PM, and 1 drop at 10 PM.  Starting the day after surgery, to continue 1 drop three times daily, Disp: 1 Bottle, Rfl: 1     POTASSIUM CHLORIDE ER PO, , Disp: , Rfl:      prednisoLONE acetate (PRED FORTE) 1 % ophthalmic suspension, Operated eye  Start the day after surgery 1 drop four times a day for 1 week, then three times a day for 1 week, then twice a day for 1 week, then once a day for 1 week, then stop, Disp: 1 Bottle, Rfl: 1     Rosuvastatin Calcium (CRESTOR PO), , Disp: , Rfl:      TAMOXIFEN CITRATE PO, , Disp: , Rfl:       PHYSICAL EXAM     Vital signs  BP (!) 144/70   Ht 1.575 m (5' 2\")   Wt 53.5 kg (118 lb)   BMI 21.58 kg/m      Weight:   118 lbs 0 oz    Patient is alert and oriented x1 patient is hard of hearing.  Most of the visit was done with the help of the son rest of the exam was not possible on phone visit     DIAGNOSTIC STUDIES     PERTINENT RADIOLOGY  Following imaging studies were " reviewed:     (01/04/2018 11:24 CST BRAIN PET Order)  Pattern of brain hypometabolism compatible with Alzheimer's dementia    MRI HEAD & ORBITS 7/14/2016  HEAD MRI:  1.  No acute infarct or intracranial hemorrhage.  2.  Sequela of remote ischemic insult to the right cerebellar hemisphere.  3.  Mild to moderate age-related changes.     ORBIT MRI:  1.  Lobular enhancing structures within the left orbit correlate to findings on unenhanced head CT examination dated 07/13/2016. Noted lesion extension into the left orbital apex demonstrates contiguity with multispacial enhancing soft tissue mass   expanding the left pterygopalatine fossa, left sphenopalatine foramen, and retroantral fat pad. Signal characteristics are compatible with a venous vascular malformation and multiple orbital varices.  Findings are likely congenital , with   hypopneumatization of the left sphenoid sinus. Correlation with intermittent, reversible proptosis elicited by Valsalva maneuver or head positioning is recommended. MRA examination of the head could be performed to exclude high flow arteriovenous   component as clinically warranted.  2.  Tortuosity and fusiform expansion of the intraorbital and intracanalicular segments of the right optic nerve, with nonenhancing T2 prolongation. Findings are compatible with known optic pathway glioma.    MRI BRAIN 1/19/2017  1. The exam is considerably degraded by motion.  2. No acute infarcts, intra-axial mass lesions or hemorrhage.  3. Age-related changes as detailed above.  4. The patient's known optic nerve gliomas within the orbits are suboptimally seen due to motion.    EEG 7/14/2016  This is abnormal EEG due to diffusely slow background in theta and delta range that suggests nonspecific generalized cerebral dysfunction.  In addition there is rare left temporal sharp activity that suggests low threshold for focal seizures.     PERTINENT LABS  Following labs were reviewed:  Result Name Current Result  Reference Range   Sodium Level (mmol/L)  143 2/4/2020 136 - 145   Potassium Level (mmol/L)  3.9 2/4/2020 3.5 - 5.0   Chloride Level (mmol/L) ((H)) 111 2/4/2020 98 - 107   CO2 Level (mmol/L)  24 2/4/2020 22 - 31   AGAP (mmol/L)  8 2/4/2020 5 - 18   Glucose Level (mg/dL)  89 2/4/2020 70 - 125   BUN (mg/dL)  13 2/4/2020 8 - 28   Creatinine Level (mg/dL)  0.61 2/4/2020 0.60 - 1.10   eGFR  (mL/min/1.73m2)  >60 2/4/2020 >60 -    eGFR (mL/min/1.73m2)  >60 2/4/2020 >60 -    Bilirubin Total (mg/dL)  0.8 2/4/2020 0.0 - 1.0   Calcium Level (mg/dL)  9.7 2/4/2020 8.5 - 10.5   Protein Total (gm/dL)  6.6 2/4/2020 6.0 - 8.0   Albumin Level (gm/dL) ((L)) 3.4 2/4/2020 3.5 - 5.0   Alkaline Phosphatase (U/L)  56 2/4/2020 45 - 120   AST/SGOT (U/L)  19 2/4/2020 0 - 40   ALT/SGPT (U/L)  16 2/4/2020 0 - 45        TELEPHONE VISIT CONSENT   This telephone visit was done in lieu of a face to face visit due to COVID 19 pandemic.   Total Time: visit 30 minutes            Total time spent for face to face visit, reviewing labs/imaging studies, counseling and coordination of care was: 30 Minutes spent on the date of the encounter doing chart review, review of test results, patient visit, documentation and discussion with family       This note was dictated using voice recognition software.  Any grammatical or context distortions are unintentional and inherent to the software.             Again, thank you for allowing me to participate in the care of your patient.        Sincerely,        Cayden Estrada MD

## 2021-01-16 ENCOUNTER — TELEPHONE (OUTPATIENT)
Dept: NEUROLOGY | Facility: CLINIC | Age: 86
End: 2021-01-16

## 2021-01-16 DIAGNOSIS — G40.209 PARTIAL SYMPTOMATIC EPILEPSY WITH COMPLEX PARTIAL SEIZURES, NOT INTRACTABLE, WITHOUT STATUS EPILEPTICUS (H): Primary | ICD-10-CM

## 2021-01-16 RX ORDER — LEVETIRACETAM 100 MG/ML
500 SOLUTION ORAL 2 TIMES DAILY
Qty: 450 ML | Refills: 11 | Status: SHIPPED | OUTPATIENT
Start: 2021-01-16 | End: 2022-01-01

## 2021-01-16 NOTE — LETTER
February 19, 2021      David Sutton  [address]  [facility] [place] [address]        Dear ,    We are writing to inform you of your test results.    Keppra level is normal. Continue current dose  Component      Latest Ref Rng & Units 2/16/2021   Sodium      136 - 145 mmol/L 143   Potassium      3.5 - 5.0 mmol/L 3.5   Chloride      98 - 107 mmol/L 108 (H)   CO2      22 - 31 mmol/L 26   Anion Gap, Calculation      5 - 18 mmol/L 9   Glucose      70 - 125 mg/dL 84   BUN      8 - 28 mg/dL 15   Creatinine      0.60 - 1.10 mg/dL 0.64   GFR MDRD Af Amer      >60 mL/min/1.73m2 >60   GFR MDRD Non Af Amer      >60 mL/min/1.73m2 >60   Bilirubin, Total      0.0 - 1.0 mg/dL 0.9   Calcium      8.5 - 10.5 mg/dL 9.1   Protein, Total      6.0 - 8.0 g/dL 6.2     ALBUMIN      3.5 - 5.0 g/dL   3.4 (L)   Alkaline Phosphatase      45 - 120 U/L 68   AST      0 - 40 U/L 19   ALT      0 - 45 U/L 17   Levetiracetam      6.0 - 46.0 ug/mL 23.0           If you have any questions or concerns, please call the clinic at the number listed above.       Sincerely,        Cayden Estrada MD  [facility]     (Formerly known as [facility])

## 2021-01-17 NOTE — TELEPHONE ENCOUNTER
Keppra level is significantly high.  Decrease Keppra dose to 500 mg (5 mL) twice daily and repeat a trough Keppra level after 2 weeks.  See orders.  Results for ERROL JEFFERSON as of 1/16/2021 18:05   Ref. Range 1/13/2021 13:28   Sodium Latest Ref Range: 136 - 145 mmol/L 143   Potassium Latest Ref Range: 3.5 - 5.0 mmol/L 4.2   Chloride Latest Ref Range: 98 - 107 mmol/L 109 (H)   CO2 Latest Ref Range: 22 - 31 mmol/L 22   Anion Gap, Calculation Latest Ref Range: 5 - 18 mmol/L 12   BUN Latest Ref Range: 8 - 28 mg/dL 18   Creatinine Latest Ref Range: 0.60 - 1.10 mg/dL 0.63   GFR MDRD Af Amer Latest Ref Range: >60 mL/min/1.73m2 >60   GFR MDRD Non Af Amer Latest Ref Range: >60 mL/min/1.73m2 >60   Calcium Latest Ref Range: 8.5 - 10.5 mg/dL 9.1   AST Latest Ref Range: 0 - 40 U/L 23   ALT Latest Ref Range: 0 - 45 U/L 16   ALBUMIN Latest Ref Range: 3.5 - 5.0 g/dL 3.2 (L)   Protein, Total Latest Ref Range: 6.0 - 8.0 g/dL 6.7   Alkaline Phosphatase Latest Ref Range: 45 - 120 U/L 67   Bilirubin, Total Latest Ref Range: 0.0 - 1.0 mg/dL 0.5   Glucose Latest Ref Range: 70 - 125 mg/dL 70   Levetiracetam Latest Ref Range: 6.0 - 46.0 ug/mL 56.3 (H)

## 2021-01-18 NOTE — TELEPHONE ENCOUNTER
Called [facility]  Mary Beth Mccabe CMA on 1/18/2021 at 3:34 PM

## 2021-01-19 ENCOUNTER — COMMUNICATION - HEALTHEAST (OUTPATIENT)
Dept: CARDIOLOGY | Facility: CLINIC | Age: 86
End: 2021-01-19

## 2021-01-19 DIAGNOSIS — I25.10 CORONARY ATHEROSCLEROSIS OF NATIVE CORONARY ARTERY: ICD-10-CM

## 2021-02-16 ENCOUNTER — RECORDS - HEALTHEAST (OUTPATIENT)
Dept: LAB | Facility: CLINIC | Age: 86
End: 2021-02-16

## 2021-02-16 LAB
ALBUMIN SERPL-MCNC: 3.4 G/DL (ref 3.5–5)
ALP SERPL-CCNC: 68 U/L (ref 45–120)
ALT SERPL W P-5'-P-CCNC: 17 U/L (ref 0–45)
ANION GAP SERPL CALCULATED.3IONS-SCNC: 9 MMOL/L (ref 5–18)
AST SERPL W P-5'-P-CCNC: 19 U/L (ref 0–40)
BILIRUB SERPL-MCNC: 0.9 MG/DL (ref 0–1)
BUN SERPL-MCNC: 15 MG/DL (ref 8–28)
CALCIUM SERPL-MCNC: 9.1 MG/DL (ref 8.5–10.5)
CHLORIDE BLD-SCNC: 108 MMOL/L (ref 98–107)
CO2 SERPL-SCNC: 26 MMOL/L (ref 22–31)
CREAT SERPL-MCNC: 0.64 MG/DL (ref 0.6–1.1)
GFR SERPL CREATININE-BSD FRML MDRD: >60 ML/MIN/1.73M2
GLUCOSE BLD-MCNC: 84 MG/DL (ref 70–125)
LEVETIRACETAM (KEPPRA): 23 UG/ML (ref 6–46)
POTASSIUM BLD-SCNC: 3.5 MMOL/L (ref 3.5–5)
PROT SERPL-MCNC: 6.2 G/DL (ref 6–8)
SODIUM SERPL-SCNC: 143 MMOL/L (ref 136–145)

## 2021-02-17 NOTE — TELEPHONE ENCOUNTER
See Keppra trough level:    Component      Latest Ref Rng & Units 2/16/2021   Sodium      136 - 145 mmol/L 143   Potassium      3.5 - 5.0 mmol/L 3.5   Chloride      98 - 107 mmol/L 108 (H)   CO2      22 - 31 mmol/L 26   Anion Gap, Calculation      5 - 18 mmol/L 9   Glucose      70 - 125 mg/dL 84   BUN      8 - 28 mg/dL 15   Creatinine      0.60 - 1.10 mg/dL 0.64   GFR MDRD Af Amer      >60 mL/min/1.73m2 >60   GFR MDRD Non Af Amer      >60 mL/min/1.73m2 >60   Bilirubin, Total      0.0 - 1.0 mg/dL 0.9   Calcium      8.5 - 10.5 mg/dL 9.1   Protein, Total      6.0 - 8.0 g/dL 6.2   ALBUMIN      3.5 - 5.0 g/dL 3.4 (L)   Alkaline Phosphatase      45 - 120 U/L 68   AST      0 - 40 U/L 19   ALT      0 - 45 U/L 17   Levetiracetam      6.0 - 46.0 ug/mL 23.0

## 2021-03-02 ENCOUNTER — RECORDS - HEALTHEAST (OUTPATIENT)
Dept: ADMINISTRATIVE | Facility: OTHER | Age: 86
End: 2021-03-02

## 2021-03-02 ENCOUNTER — AMBULATORY - HEALTHEAST (OUTPATIENT)
Dept: CARDIOLOGY | Facility: CLINIC | Age: 86
End: 2021-03-02

## 2021-03-03 ENCOUNTER — OFFICE VISIT - HEALTHEAST (OUTPATIENT)
Dept: CARDIOLOGY | Facility: CLINIC | Age: 86
End: 2021-03-03

## 2021-03-03 DIAGNOSIS — I25.10 CORONARY ATHEROSCLEROSIS: ICD-10-CM

## 2021-03-03 DIAGNOSIS — E87.6 HYPOKALEMIA: ICD-10-CM

## 2021-03-03 DIAGNOSIS — I25.10 CORONARY ATHEROSCLEROSIS OF NATIVE CORONARY ARTERY: ICD-10-CM

## 2021-03-03 DIAGNOSIS — E78.5 DYSLIPIDEMIA: ICD-10-CM

## 2021-03-03 DIAGNOSIS — I26.99 PULMONARY EMBOLISM AND INFARCTION (H): ICD-10-CM

## 2021-05-24 ENCOUNTER — RECORDS - HEALTHEAST (OUTPATIENT)
Dept: ADMINISTRATIVE | Facility: CLINIC | Age: 86
End: 2021-05-24

## 2021-05-25 ENCOUNTER — RECORDS - HEALTHEAST (OUTPATIENT)
Dept: ADMINISTRATIVE | Facility: CLINIC | Age: 86
End: 2021-05-25

## 2021-05-26 ENCOUNTER — RECORDS - HEALTHEAST (OUTPATIENT)
Dept: ADMINISTRATIVE | Facility: CLINIC | Age: 86
End: 2021-05-26

## 2021-05-26 NOTE — TELEPHONE ENCOUNTER
I should try to touch base with dr benavides upon my return. Can I get his number as a reminder  ty mdg

## 2021-05-27 ENCOUNTER — RECORDS - HEALTHEAST (OUTPATIENT)
Dept: ADMINISTRATIVE | Facility: CLINIC | Age: 86
End: 2021-05-27

## 2021-05-27 NOTE — TELEPHONE ENCOUNTER
Ranjeet Luz MD  Phone: [phone].    Call clinic - press option for provider.  Dr Luz will be in Tuesday - Friday this week.  -gali

## 2021-05-27 NOTE — TELEPHONE ENCOUNTER
Notes recorded by Garry Maynard MD on 3/28/2019 at 10:43 AM CDT  I spoke with Dr. Luz yesterday I deferred additional decisions to him.MDG  ------

## 2021-05-28 ENCOUNTER — RECORDS - HEALTHEAST (OUTPATIENT)
Dept: ADMINISTRATIVE | Facility: CLINIC | Age: 86
End: 2021-05-28

## 2021-05-29 ENCOUNTER — RECORDS - HEALTHEAST (OUTPATIENT)
Dept: ADMINISTRATIVE | Facility: CLINIC | Age: 86
End: 2021-05-29

## 2021-05-30 ENCOUNTER — RECORDS - HEALTHEAST (OUTPATIENT)
Dept: ADMINISTRATIVE | Facility: CLINIC | Age: 86
End: 2021-05-30

## 2021-05-31 ENCOUNTER — RECORDS - HEALTHEAST (OUTPATIENT)
Dept: ADMINISTRATIVE | Facility: CLINIC | Age: 86
End: 2021-05-31

## 2021-05-31 VITALS — BODY MASS INDEX: 28.71 KG/M2 | WEIGHT: 156 LBS | HEIGHT: 62 IN

## 2021-05-31 VITALS — HEIGHT: 62 IN | BODY MASS INDEX: 28.71 KG/M2 | WEIGHT: 156 LBS

## 2021-06-01 ENCOUNTER — RECORDS - HEALTHEAST (OUTPATIENT)
Dept: ADMINISTRATIVE | Facility: CLINIC | Age: 86
End: 2021-06-01

## 2021-06-01 VITALS — WEIGHT: 134 LBS | BODY MASS INDEX: 24.66 KG/M2 | HEIGHT: 62 IN

## 2021-06-02 ENCOUNTER — RECORDS - HEALTHEAST (OUTPATIENT)
Dept: ADMINISTRATIVE | Facility: CLINIC | Age: 86
End: 2021-06-02

## 2021-06-02 VITALS — BODY MASS INDEX: 21.71 KG/M2 | HEIGHT: 62 IN | WEIGHT: 118 LBS

## 2021-06-03 ENCOUNTER — RECORDS - HEALTHEAST (OUTPATIENT)
Dept: ADMINISTRATIVE | Facility: CLINIC | Age: 86
End: 2021-06-03

## 2021-06-05 ENCOUNTER — RECORDS - HEALTHEAST (OUTPATIENT)
Dept: CARDIOLOGY | Facility: CLINIC | Age: 86
End: 2021-06-05

## 2021-06-05 VITALS
RESPIRATION RATE: 16 BRPM | BODY MASS INDEX: 21.58 KG/M2 | DIASTOLIC BLOOD PRESSURE: 64 MMHG | WEIGHT: 118 LBS | HEART RATE: 54 BPM | SYSTOLIC BLOOD PRESSURE: 102 MMHG

## 2021-06-05 DIAGNOSIS — I25.10 CORONARY ATHEROSCLEROSIS: ICD-10-CM

## 2021-06-08 NOTE — PROGRESS NOTES
[facility]      Facility:    [facility] [[id number]]    Code Status: FULL CODE      Chief Complaint/Reason for Visit:  Chief Complaint   Patient presents with     Problem Visit       HPI:   Sarkis is a 84 y.o. female who with a history of dementia who does not typically ambulate who presented complaining of weakness and fevers. In the ER she was found to have influenza B. CT scan of the abdomen and pelvis revealed pulmonary embolism and infarction. There are also infiltrates concerning for pneumonia. Patient was also found to have an abnormal urine with probable UTI. She was admitted for further evaluation and treatment .  Acute pulmonary embolism with infarction. Patient was admitted and started on heparin and warfarin. Ultimately it was decided to send her out on Xarelto and she was transitioned to this at 50 mg twice a day. She will need this through February 8 and at that time she should transition to once daily dosing 20 mg daily indefinitely. She did have a non occlusive DVT.  Influenza B with superimposed community acquired pneumonia. The patient received a complete course of Tamiflu while here in the hospital. She also received a 6 day course of Levaquin  E. coli UTI. 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 Mri was not changed, Neurology consultant felt the changes were likely from a new neurofibroma lesion  She was encephalopathic and weak, but both improved as she was treated  She had mildly increased LFTs, does have a known seizure disorder    Past Medical History:  Past Medical History:   Diagnosis Date     Cancer     breast     Cataract     r eye with lid lag     Cognitive decline      Complex partial seizure disorder 7/15/2016    Dysrhythmia grade III Lt Temporal     Coronary artery disease      Hyperlipidemia      Influenza B 01/18/2017     Pulmonary embolism 01/18/2017     Rt.  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     Tremor     chronic, now w/c bound            Surgical History:  Past Surgical History:   Procedure Laterality Date     CARDIAC CATHETERIZATION       CHOLECYSTECTOMY      per son     MASTECTOMY       NE CABG, VEIN, SINGLE      Description: CABG (CABG);  Proc Date: 10/30/2007;  Comments: LIMA to Distal LAD; ; SVG to PDA; ; Both grafts patent on Angiogram 9/09     ROTATOR CUFF REPAIR Bilateral             Review of Systems   Very [place]  Lets her son do the talking, her memory seems impaired  At home she need help with transfers, uses a walker to go 10-15 feet maximum distance, needs help getting dressed, she is incontinent      Blood pressure 118/70, pulse 74, temperature 96.9  F (36.1  C), resp. rate 18, SpO2 97 %.      Physical Exam   Constitutional:   Seems anxious, wants to be near her son   Cardiovascular: Regular rhythm and normal heart sounds.    No murmur heard.  Pulmonary/Chest: Effort normal. She has rales.   Abdominal: Soft. Bowel sounds are normal. There is no tenderness.   Musculoskeletal: She exhibits edema.   Wheelchair bound  Weak legs and arms, decreased ROM   Neurological: She is alert.   Right eyelid ptosis  Does follow one step commands  Not oriented to place or time  STM and awareness deficits   Psychiatric:   Keeps asking her son to stay with her  Fiddling with her shawl       Medication List:  Current Outpatient Prescriptions   Medication Sig     acetaminophen (TYLENOL) 500 MG tablet Take 500-1,000 mg by mouth every 6 (six) hours as needed for pain.     aspirin 81 MG tablet Take 81 mg by mouth daily.      furosemide (LASIX) 40 MG tablet Take 40 mg by mouth daily.     ipratropium-albuterol (DUONEB) 0.5-2.5 mg/3 mL nebulizer Take 3 mL by nebulization every 6 (six) hours.     isosorbide mononitrate (IMDUR) 30 MG 24 hr tablet Take 0.5 tablets (15 mg total) by mouth daily.     ketoconazole (NIZORAL) 2 % cream Apply 1 application topically every evening.     levETIRAcetam  (KEPPRA) 100 mg/mL solution Take 7.5 mL by mouth 2 (two) times a day.     magnesium hydroxide (MILK OF MAG) 400 mg/5 mL Susp suspension Take 30 mL by mouth daily as needed.     metoprolol tartrate (LOPRESSOR) 25 MG tablet Take 25 mg by mouth 2 (two) times a day.     multivitamin with minerals (THERA-M) 9 mg iron-400 mcg Tab tablet Take 1 tablet by mouth daily.     nitroglycerin (NITROSTAT) 0.4 MG SL tablet Place 1 tablet (0.4 mg total) under the tongue every 5 (five) minutes as needed for chest pain.     nystatin (MYCOSTATIN) powder Apply 1 application topically 2 (two) times a day.      omeprazole (PRILOSEC) 20 MG capsule Take 20 mg by mouth 2 (two) times a day as needed.     potassium chloride SA (KLOR-CON M10) 10 MEQ tablet Take 1 tablet (10 mEq total) by mouth daily.     rivaroxaban 15 mg Tab Take 1 tablet (15 mg total) by mouth 2 (two) times a day with meals for 15 days.     [START ON 2/9/2017] rivaroxaban 20 mg Tab Take 1 tablet (20 mg total) by mouth daily with supper.     rosuvastatin (CRESTOR) 40 MG tablet Take 40 mg by mouth daily.     tamoxifen (NOLVADEX) 20 MG tablet Take 20 mg by mouth daily.       Labs:    1/24/17         Sodium 136 - 145 mmol/L 138   Potassium 3.5 - 5.0 mmol/L 4.2   Chloride 98 - 107 mmol/L 111 (H)   CO2 22 - 31 mmol/L 19 (L)   Anion Gap, Calculation 5 - 18 mmol/L 8   Glucose 70 - 125 mg/dL 104   BUN 8 - 28 mg/dL 10   Creatinine 0.60 - 1.10 mg/dL 0.63   GFR MDRD Af Amer >60 mL/min/1.73m2 >60   GFR MDRD Non Af Amer >60 mL/min/1.73m2 >60   Bilirubin, Total 0.0 - 1.0 mg/dL 0.4   Calcium 8.5 - 10.5 mg/dL 9.4   Protein, Total 6.0 - 8.0 g/dL 6.0   Albumin 3.5 - 5.0 g/dL 2.4 (L)   Alkaline Phosphatase 45 - 120 U/L 156 (H)   AST 0 - 40 U/L 47 (H)   ALT 0 - 45 U/L 76 (H     1/22/17  6:33 AM       WBC 4.0 - 11.0 thou/uL 7.3   RBC 3.80 - 5.40 mill/uL 3.65 (L)   Hemoglobin 12.0 - 16.0 g/dL 12.3   Hematocrit 35.0 - 47.0 % 35.4   MCV 80 - 100 fL 97   MCH 27.0 - 34.0 pg 33.7   MCHC 32.0 - 36.0 g/dL  34.7   RDW 11.0 - 14.5 % 13.4   Platelets 140 - 440 thou/uL 281         Assessment:  1. Pulmonary embolism and infarction I26.99     2. CAP (community acquired pneumonia) J18.9     3. Pulmonary infarct I26.99     4. Influenza B J10.1     5. Partial symptomatic epilepsy with complex partial seizures, not intractable, without status epilepticus G40.209     6. 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 Atherosclerosis of native coronary artery of native heart without angina pectoris I25.10     8. Hearing loss, bilateral H91.93     9. Encephalopathy G93.40              Plan:  Rehab for strengthening  Continue current medications.  Children take care of her 24/7, and apparently plan to continue to do this.      Electronically signed by: Bianca Gonzalez MD

## 2021-06-08 NOTE — PROGRESS NOTES
[facility]    Facility:   [facility] [[id number]]     Code Status: FULL CODE  PCP: Ranjeet Luz MD   Phone: [phone]   Fax: [phone]      CHIEF COMPLAINT/REASON FOR VISIT:  Chief Complaint   Patient presents with     Discharge Summary       HISTORY COURSE:  84 year old who at baseline is demented, complex partial seizures, CAD and istotally dependent on her sons 24/7 who developed weakness and fevers.  In the hospital she had a PE with infarction CAP, Influenza B and an UTI.    She transferred for rehabilitation at [facility]; she was somewhat limited in her physical capacity, but did improve to her baseline cognition.    Review of Systems    Vitals:    02/05/17 1333   BP: 120/60   Pulse: 74   Resp: 18   Temp: 97.3  F (36.3  C)   SpO2: 96%       Physical Exam  Seems less anxious   Cardiovascular: Regular rhythm and normal heart sounds. No murmur heard.  Pulmonary/Chest: Effort normal. She has rales.   Abdominal: Soft. Bowel sounds are normal. There is no tenderness.   Musculoskeletal: She exhibits edema.   Wheelchair bound  Weak legs and arms, decreased ROM   Neurological: She is alert.   Right eyelid ptosis  Does follow one step commands  Not oriented to place or time  STM and awareness deficits           MEDICATION LIST:  Current Outpatient Prescriptions   Medication Sig     acetaminophen (TYLENOL) 500 MG tablet Take 500-1,000 mg by mouth every 6 (six) hours as needed for pain.     aspirin 81 MG tablet Take 81 mg by mouth daily.      furosemide (LASIX) 40 MG tablet Take 40 mg by mouth daily.     ipratropium-albuterol (DUONEB) 0.5-2.5 mg/3 mL nebulizer Take 3 mL by nebulization every 6 (six) hours.     isosorbide mononitrate (IMDUR) 30 MG 24 hr tablet Take 0.5 tablets (15 mg total) by mouth daily.     ketoconazole (NIZORAL) 2 % cream Apply 1 application topically every evening.     levETIRAcetam (KEPPRA) 100 mg/mL solution Take 7.5 mL by mouth 2 (two)  times a day.     magnesium hydroxide (MILK OF MAG) 400 mg/5 mL Susp suspension Take 30 mL by mouth daily as needed.     metoprolol tartrate (LOPRESSOR) 25 MG tablet Take 25 mg by mouth 2 (two) times a day.     multivitamin with minerals (THERA-M) 9 mg iron-400 mcg Tab tablet Take 1 tablet by mouth daily.     nitroglycerin (NITROSTAT) 0.4 MG SL tablet Place 1 tablet (0.4 mg total) under the tongue every 5 (five) minutes as needed for chest pain.     nystatin (MYCOSTATIN) powder Apply 1 application topically 2 (two) times a day.      omeprazole (PRILOSEC) 20 MG capsule Take 20 mg by mouth 2 (two) times a day as needed.     potassium chloride SA (KLOR-CON M10) 10 MEQ tablet Take 1 tablet (10 mEq total) by mouth daily.     rivaroxaban 20 mg Tab Take 1 tablet (20 mg total) by mouth daily with supper.     rosuvastatin (CRESTOR) 40 MG tablet Take 40 mg by mouth daily.     tamoxifen (NOLVADEX) 20 MG tablet Take 20 mg by mouth daily.       DISCHARGE DIAGNOSIS:    ICD-10-CM    1. Pulmonary embolism and infarction I26.99    2. CAP (community acquired pneumonia) J18.9    3. Influenza B J10.1    4. Generalized weakness R53.1    5. Dementia without behavioral disturbance, unspecified dementia type F03.90    6. Partial symptomatic epilepsy with complex partial seizures, not intractable, without status epilepticus G40.209    7. Atherosclerosis of native coronary artery of native heart without angina pectoris I25.10    8. Urinary tract infection without hematuria, site unspecified N39.0    9. 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 Discussed with her son Claudio who agrees she is at baseline and ready  for discharge Conference with family confirms readiness for discharge, no home services deemed necessary.    The patient is, or has been, under my care and I have initiated the establishment of the plan of care. This patient will be followed by a physician who will periodically review the plan of care.      Electronically signed by: Bianca Gonzalez MD

## 2021-06-08 NOTE — PROGRESS NOTES
[facility]      Facility:    [facility] [[id number]]    Code Status: FULL CODE      Chief Complaint/Reason for Visit:  Chief Complaint   Patient presents with     Problem Visit       HPI:   Sarkis is a 84 y.o. female who with a history of dementia who does not typically ambulate who presented complaining of weakness and fevers. In the ER she was found to have influenza B. CT scan of the abdomen and pelvis revealed pulmonary embolism and infarction. There are also infiltrates concerning for pneumonia. Patient was also found to have an abnormal urine with probable UTI. She was admitted for further evaluation and treatment .  Acute pulmonary embolism with infarction. Patient was admitted and started on heparin and warfarin. Ultimately it was decided to send her out on Xarelto and she was transitioned to this at 50 mg twice a day. She will need this through February 8 and at that time she should transition to once daily dosing 20 mg daily indefinitely. She did have a non occlusive DVT.  Influenza B with superimposed community acquired pneumonia. The patient received a complete course of Tamiflu while here in the hospital. She also received a 6 day course of Levaquin  E. coli UTI. 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 Mri was not changed, Neurology consultant felt the changes were likely from a new neurofibroma lesion  She was encephalopathic and weak, but both improved as she was treated  She had mildly increased LFTs, does have a known seizure disorder    Past Medical History:  Past Medical History:   Diagnosis Date     Cancer     breast     Cataract     r eye with lid lag     Cognitive decline      Complex partial seizure disorder 7/15/2016    Dysrhythmia grade III Lt Temporal     Coronary artery disease      Hyperlipidemia      Influenza B 01/18/2017     Pulmonary embolism 01/18/2017     Rt.  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     Tremor     chronic, now w/c bound            Surgical History:  Past Surgical History:   Procedure Laterality Date     CARDIAC CATHETERIZATION       CHOLECYSTECTOMY      per son     MASTECTOMY       [place] CABG, VEIN, SINGLE      Description: CABG (CABG);  Proc Date: 10/30/2007;  Comments: LIMA to Distal LAD; ; SVG to PDA; ; Both grafts patent on Angiogram 9/09     ROTATOR CUFF REPAIR Bilateral             Review of Systems   Very [place]  Lets her son do the talking, her memory seems impaired  At home she need help with transfers, uses a walker to go 10-15 feet maximum distance, needs help getting dressed, she is incontinent      Vital signs are reviewed    Physical Exam :   Seems anxious   Cardiovascular: Regular rhythm and normal heart sounds.   No murmur heard.  Pulmonary/Chest: Effort normal. She has rales.   Abdominal: Soft. Bowel sounds are normal. There is no tenderness.   Musculoskeletal: She exhibits edema.   Wheelchair bound  Weak legs and arms, decreased ROM   Neurological: She is alert.   Right eyelid ptosis  Does follow one step commands  Not oriented to place or time  STM and awareness deficits            Medication List:  Current Outpatient Prescriptions   Medication Sig     acetaminophen (TYLENOL) 500 MG tablet Take 500-1,000 mg by mouth every 6 (six) hours as needed for pain.     aspirin 81 MG tablet Take 81 mg by mouth daily.      furosemide (LASIX) 40 MG tablet Take 40 mg by mouth daily.     ipratropium-albuterol (DUONEB) 0.5-2.5 mg/3 mL nebulizer Take 3 mL by nebulization every 6 (six) hours.     isosorbide mononitrate (IMDUR) 30 MG 24 hr tablet Take 0.5 tablets (15 mg total) by mouth daily.     ketoconazole (NIZORAL) 2 % cream Apply 1 application topically every evening.     levETIRAcetam (KEPPRA) 100 mg/mL solution Take 7.5 mL by mouth 2 (two) times a day.     magnesium hydroxide (MILK OF MAG) 400 mg/5 mL Susp suspension Take 30 mL by mouth daily as needed.      metoprolol tartrate (LOPRESSOR) 25 MG tablet Take 25 mg by mouth 2 (two) times a day.     multivitamin with minerals (THERA-M) 9 mg iron-400 mcg Tab tablet Take 1 tablet by mouth daily.     nitroglycerin (NITROSTAT) 0.4 MG SL tablet Place 1 tablet (0.4 mg total) under the tongue every 5 (five) minutes as needed for chest pain.     nystatin (MYCOSTATIN) powder Apply 1 application topically 2 (two) times a day.      omeprazole (PRILOSEC) 20 MG capsule Take 20 mg by mouth 2 (two) times a day as needed.     potassium chloride SA (KLOR-CON M10) 10 MEQ tablet Take 1 tablet (10 mEq total) by mouth daily.     rivaroxaban 15 mg Tab Take 1 tablet (15 mg total) by mouth 2 (two) times a day with meals for 15 days.     [START ON 2/9/2017] rivaroxaban 20 mg Tab Take 1 tablet (20 mg total) by mouth daily with supper.     rosuvastatin (CRESTOR) 40 MG tablet Take 40 mg by mouth daily.     tamoxifen (NOLVADEX) 20 MG tablet Take 20 mg by mouth daily.       Labs:    1/24/17         Sodium 136 - 145 mmol/L 138   Potassium 3.5 - 5.0 mmol/L 4.2   Chloride 98 - 107 mmol/L 111 (H)   CO2 22 - 31 mmol/L 19 (L)   Anion Gap, Calculation 5 - 18 mmol/L 8   Glucose 70 - 125 mg/dL 104   BUN 8 - 28 mg/dL 10   Creatinine 0.60 - 1.10 mg/dL 0.63   GFR MDRD Af Amer >60 mL/min/1.73m2 >60   GFR MDRD Non Af Amer >60 mL/min/1.73m2 >60   Bilirubin, Total 0.0 - 1.0 mg/dL 0.4   Calcium 8.5 - 10.5 mg/dL 9.4   Protein, Total 6.0 - 8.0 g/dL 6.0   Albumin 3.5 - 5.0 g/dL 2.4 (L)   Alkaline Phosphatase 45 - 120 U/L 156 (H)   AST 0 - 40 U/L 47 (H)   ALT 0 - 45 U/L 76 (H     1/22/17  6:33 AM       WBC 4.0 - 11.0 thou/uL 7.3   RBC 3.80 - 5.40 mill/uL 3.65 (L)   Hemoglobin 12.0 - 16.0 g/dL 12.3   Hematocrit 35.0 - 47.0 % 35.4   MCV 80 - 100 fL 97   MCH 27.0 - 34.0 pg 33.7   MCHC 32.0 - 36.0 g/dL 34.7   RDW 11.0 - 14.5 % 13.4   Platelets 140 - 440 thou/uL 281         Assessment:      ICD-10-CM    1. Pulmonary embolism and infarction I26.99    2. CAP (community acquired  pneumonia) J18.9    3. Influenza B J10.1    4. Urinary tract infection N39.0    5. 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 Partial symptomatic epilepsy with complex partial seizures, not intractable, without status epilepticus G40.209    7. Atherosclerosis of native coronary artery of native heart without angina pectoris I25.10    8. Dementia without behavioral disturbance, unspecified dementia type F03.90    9. Generalized weakness R53.1    10. Hearing loss, bilateral H91.93    11. 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 Urinary tract infection without hematuria, site unspecified N39.0            Plan:  Rehab for strengthening has plateaued, should soon discharge  Continue current medications.  Children take care of her 24/7, and apparently plan to continue to do this.      Electronically signed by: Bianca Gonzalez MD

## 2021-06-08 NOTE — PROGRESS NOTES
[facility]      Facility:    [facility] [[id number]]    Code Status: FULL CODE      Chief Complaint/Reason for Visit:  Chief Complaint   Patient presents with     H & P       HPI:   Sarkis is a 84 y.o. female who with a history of dementia who does not typically ambulate who presented complaining of weakness and fevers. In the ER she was found to have influenza B. CT scan of the abdomen and pelvis revealed pulmonary embolism and infarction. There are also infiltrates concerning for pneumonia. Patient was also found to have an abnormal urine with probable UTI. She was admitted for further evaluation and treatment .  Acute pulmonary embolism with infarction. Patient was admitted and started on heparin and warfarin. Ultimately it was decided to send her out on Xarelto and she was transitioned to this at 50 mg twice a day. She will need this through February 8 and at that time she should transition to once daily dosing 20 mg daily indefinitely. She did have a non occlusive DVT.  Influenza B with superimposed community acquired pneumonia. The patient received a complete course of Tamiflu while here in the hospital. She also received a 6 day course of Levaquin  E. coli UTI. 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     Tremor      chronic, now w/c bound            Surgical History:  Past Surgical History   Procedure Laterality Date     Pr cabg, vein, single       Description: CABG (CABG);  Proc Date: 10/30/2007;  Comments: LIMA to Distal LAD; ; SVG to PDA; ; Both grafts patent on Angiogram 9/09     Cardiac catheterization       Mastectomy       Cholecystectomy       per son     Rotator cuff repair Bilateral        Family History:   Family History   Problem Relation Age of Onset     Cancer Mother      breast     Cancer Father      pancreas       Social History:    Social History     Social History     Marital status:      Spouse name: Claudio     Number of children: 3     Years of education: N/A     Occupational History     retired      Social History Main Topics     Smoking status: Never Smoker     Smokeless tobacco: Never Used     Alcohol use No     Drug use: No     Sexual activity: No     Other Topics Concern     Not on file     Social History Narrative     50 years with 3 grown children and 2 adult grandchildren.    Resides with  in [facility] they own    Retired  and  provider    HS graduate 2yr College degree          Review of Systems   Very [place]  Lets her son do the talking, her memory seems impaired  At home she need help with transfers, uses a walker to go 10-15 feet maximum distance, needs help getting dressed, she is incontinent  The remainder of the comprehensive ROS is negative    Vitals reviewed  Physical Exam   Constitutional:   Seems anxious, wants to be near her son   HENT:   Right Ear: External ear normal.   Left Ear: External ear normal.   Poor dental hygiene   Eyes: EOM are normal.   Cardiovascular: Regular rhythm and normal heart sounds.    No murmur heard.  Pulmonary/Chest: Effort normal. She has rales.   Abdominal: Soft. Bowel sounds are normal. There is no tenderness.   Musculoskeletal: She exhibits edema.   Wheelchair bound  Weak legs and arms,  decreased ROM   Lymphadenopathy:     She has no cervical adenopathy.   Neurological: She is alert.   Right eyelid ptosis  Does follow one step commands  Not oriented to place or time  STM and awareness deficits   Skin: Skin is warm and dry.   Psychiatric:   Keeps asking her son to stay with her  Fiddling with her shawl       Medication List:  Current Outpatient Prescriptions   Medication Sig     acetaminophen (TYLENOL) 500 MG tablet Take 500-1,000 mg by mouth every 6 (six) hours as needed for pain.     aspirin 81 MG tablet Take 81 mg by mouth daily.      furosemide (LASIX) 40 MG tablet Take 40 mg by mouth daily.     ipratropium-albuterol (DUONEB) 0.5-2.5 mg/3 mL nebulizer Take 3 mL by nebulization every 6 (six) hours.     isosorbide mononitrate (IMDUR) 30 MG 24 hr tablet Take 0.5 tablets (15 mg total) by mouth daily.     ketoconazole (NIZORAL) 2 % cream Apply 1 application topically every evening.     levETIRAcetam (KEPPRA) 100 mg/mL solution Take 7.5 mL by mouth 2 (two) times a day.     magnesium hydroxide (MILK OF MAG) 400 mg/5 mL Susp suspension Take 30 mL by mouth daily as needed.     metoprolol tartrate (LOPRESSOR) 25 MG tablet Take 25 mg by mouth 2 (two) times a day.     multivitamin with minerals (THERA-M) 9 mg iron-400 mcg Tab tablet Take 1 tablet by mouth daily.     nitroglycerin (NITROSTAT) 0.4 MG SL tablet Place 1 tablet (0.4 mg total) under the tongue every 5 (five) minutes as needed for chest pain.     nystatin (MYCOSTATIN) powder Apply 1 application topically 2 (two) times a day.      omeprazole (PRILOSEC) 20 MG capsule Take 20 mg by mouth 2 (two) times a day as needed.     potassium chloride SA (KLOR-CON M10) 10 MEQ tablet Take 1 tablet (10 mEq total) by mouth daily.     rivaroxaban 15 mg Tab Take 1 tablet (15 mg total) by mouth 2 (two) times a day with meals for 15 days.     [START ON 2/9/2017] rivaroxaban 20 mg Tab Take 1 tablet (20 mg total) by mouth daily with supper.     rosuvastatin (CRESTOR) 40  MG tablet Take 40 mg by mouth daily.     tamoxifen (NOLVADEX) 20 MG tablet Take 20 mg by mouth daily.       Labs:    1/24/17         Sodium 136 - 145 mmol/L 138   Potassium 3.5 - 5.0 mmol/L 4.2   Chloride 98 - 107 mmol/L 111 (H)   CO2 22 - 31 mmol/L 19 (L)   Anion Gap, Calculation 5 - 18 mmol/L 8   Glucose 70 - 125 mg/dL 104   BUN 8 - 28 mg/dL 10   Creatinine 0.60 - 1.10 mg/dL 0.63   GFR MDRD Af Amer >60 mL/min/1.73m2 >60   GFR MDRD Non Af Amer >60 mL/min/1.73m2 >60   Bilirubin, Total 0.0 - 1.0 mg/dL 0.4   Calcium 8.5 - 10.5 mg/dL 9.4   Protein, Total 6.0 - 8.0 g/dL 6.0   Albumin 3.5 - 5.0 g/dL 2.4 (L)   Alkaline Phosphatase 45 - 120 U/L 156 (H)   AST 0 - 40 U/L 47 (H)   ALT 0 - 45 U/L 76 (H     1/22/17  6:33 AM       WBC 4.0 - 11.0 thou/uL 7.3   RBC 3.80 - 5.40 mill/uL 3.65 (L)   Hemoglobin 12.0 - 16.0 g/dL 12.3   Hematocrit 35.0 - 47.0 % 35.4   MCV 80 - 100 fL 97   MCH 27.0 - 34.0 pg 33.7   MCHC 32.0 - 36.0 g/dL 34.7   RDW 11.0 - 14.5 % 13.4   Platelets 140 - 440 thou/uL 281         Assessment:    ICD-10-CM    1. Pulmonary embolism and infarction I26.99    2. CAP (community acquired pneumonia) J18.9    3. Pulmonary infarct I26.99    4. Influenza B J10.1    5. Partial symptomatic epilepsy with complex partial seizures, not intractable, without status epilepticus G40.209    6. 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 Atherosclerosis of native coronary artery of native heart without angina pectoris I25.10    8. Hearing loss, bilateral H91.93    9. Encephalopathy G93.40          Plan:  Rehab for strengthening  Continue current medications.  Children take care of her 24/7, and apparently plan to continue to do this.      Electronically signed by: Bianca Gonzalez MD

## 2021-06-09 NOTE — PATIENT INSTRUCTIONS - HE
It was nice to visit with you today.  I am sorry that there was some confusion.  It sounds like Ms Sutton is doing well.  I did ask that when she sees her primary care physician perhaps next month that you could request a chemistry profile, magnesium and cholesterol panel.  Please call my nurse Shilpi at [phone] with any questions or concerns.  We will plan to follow-up in 6 months or sooner if specific issues were to arise.

## 2021-06-09 NOTE — PROGRESS NOTES
Vitals - Patient Reported  Systolic (Patient Reported): 120(About a week ago)  Diastolic (Patient Reported): 70  Weight (Patient Reported): 118 lb (53.5 kg)  SpO2 (Patient Reported): 92    Review of Systems - History obtained from the son  General ROS: negative  Psychological ROS: negative  Ophthalmic ROS: negative  ENT ROS: negative  Hematological and Lymphatic ROS: negative  Respiratory ROS: negative  Cardiovascular ROS: negative  Gastrointestinal ROS: negative  Genito-Urinary ROS: negative  Musculoskeletal ROS: negative  Neurological ROS: positive for - daytime sleepiness  Dermatological ROS: negative

## 2021-06-12 NOTE — PROGRESS NOTES
[facility] Progress Note    Assessment:  1.  Coronary artery disease with infrequent chest discomfort symptoms are stable and continue to be improved on isosorbide mononitrate.  Negative nuclear stress test November 2015 as outlined.  I did not make any changes in her medication regimen.  Her son noted a low-grade intermittent fever and a dry cough.  I encouraged him to follow-up with her primary care physician in the near future and would request some follow-up laboratory studies including a basic metabolic profile and a BNP when she sees her primary care physician.  We will plan a follow-up echocardiogram as it relates to the diagnosis of pulmonary embolism a number of months ago.  She is reminded not to utilize nonsteroidal anti-inflammatory medicines in combination with Xarelto.  She had a normal BNP in January 2017.    2.  Hyperlipidemia.  Last LDL cholesterol is from July 2015 with more recent liver function tests that were normal February 2017.  Would defer to her primary care physician about follow-up lipid studies.        Plan: Echocardiogram with laboratory studies requested from her primary care physician in follow-up in 6 months.    1. CAD (coronary artery disease)  Echo Complete   2. Dyslipidemia           An After Visit Summary was printed and given to the patient.    Subjective:    Sarkis Sutton is a 85 y.o. female who returned for a planned  follow up visit.  I have reviewed the notes from the hospitalization in January 2017 when she was admitted with acute pulmonary embolism.  She had an echocardiogram at that time that demonstrated preserved left ventricular function without obvious right ventricular strain.  She has been on Xarelto.  She has some underlying dementia but no specific complaints of ongoing anginal type chest discomfort with infrequent episode of chest discomfort.  No reported significant shortness of breath.  Her son accompanies her and is her caregiver.  She is  sedentary and utilizes a wheelchair.  She does have follow-up with neurology which I encouraged.  She has a history of seizure disorder and Von Recklinghausen disease.  She has a history of coronary artery disease with bypass surgery in 2009.  She had a negative nuclear stress test in 2015.    Review of Systems:   General: WNL  Eyes: Visual Distubance  Ears/Nose/Throat: WNL  Lungs: Cough  Heart: WNL  Stomach: Constipation, Nausea  Bladder: WNL  Muscle/Joints: WNL  Skin: WNL  Nervous System: WNL  Mental Health: WNL     Blood: WNL      Problem List:    Patient Active Problem List   Diagnosis     Dyslipidemia     Coronary atherosclerosis     Encephalopathy     Rt. Optic nerve glioma     [place] vascular malformation      Complex partial seizure disorder     Von Recklinghausen's disease (NF Type1)     DJD (degenerative joint disease)     GERD (gastroesophageal reflux disease)     HX: breast cancer     Hearing loss     Moderate malnutrition     Influenza B     Pulmonary embolism and infarction     Urinary tract infection without hematuria, site unspecified     Pulmonary infarct     CAP (community acquired pneumonia)     Shortness of breath     Generalized weakness     Fatigue     Palliative care encounter     Dementia       Social History     Social History     Marital status:      Spouse name: Claudio     Number of children: 3     Years of education: N/A     Occupational History     retired      Social History Main Topics     Smoking status: Never Smoker     Smokeless tobacco: Never Used     Alcohol use No     Drug use: No     Sexual activity: No     Other Topics Concern     Not on file     Social History Narrative     50 years with 3 grown children and 2 adult grandchildren.    Resides with  in [facility] they own    Retired  and  provider    HS graduate 2yr College degree       Family History   Problem Relation Age of Onset     Cancer Mother      breast     Cancer Father      pancreas  "      Current Outpatient Prescriptions   Medication Sig Dispense Refill     acetaminophen (TYLENOL) 500 MG tablet Take 500-1,000 mg by mouth every 6 (six) hours as needed for pain.       aspirin 81 MG tablet Take 81 mg by mouth daily.        furosemide (LASIX) 40 MG tablet TAKE ONE TABLET BY MOUTH EVERY DAY. 90 tablet 1     isosorbide mononitrate (IMDUR) 30 MG 24 hr tablet Take 0.5 tablets (15 mg total) by mouth daily. (Patient taking differently: Take 0.5 tablets by mouth 2 (two) times a day. Indications: Chronic Stable Angina Pectoris)  0     ketoconazole (NIZORAL) 2 % cream Apply 1 application topically every evening.       KLOR-CON M10 10 mEq tablet TAKE ONE TABLET BY MOUTH ONE TIME DAILY  90 tablet 1     metoprolol tartrate (LOPRESSOR) 25 MG tablet TAKE ONE TABLET BY MOUTH TWO TIMES A  tablet 1     multivitamin with minerals (THERA-M) 9 mg iron-400 mcg Tab tablet Take 1 tablet by mouth daily.       nitroglycerin (NITROSTAT) 0.4 MG SL tablet Place 1 tablet (0.4 mg total) under the tongue every 5 (five) minutes as needed for chest pain. 25 tablet 6     nystatin (MYCOSTATIN) powder Apply 1 application topically 2 (two) times a day.        omeprazole (PRILOSEC) 20 MG capsule Take 20 mg by mouth 2 (two) times a day as needed.       rivaroxaban 20 mg Tab Take 1 tablet (20 mg total) by mouth daily with supper. 30 tablet 0     rosuvastatin (CRESTOR) 40 MG tablet Take 1 tablet (40 mg total) by mouth daily. 90 tablet 2     tamoxifen (NOLVADEX) 20 MG tablet Take 20 mg by mouth daily.  11     ipratropium-albuterol (DUONEB) 0.5-2.5 mg/3 mL nebulizer Take 3 mL by nebulization every 6 (six) hours.  0     magnesium hydroxide (MILK OF MAG) 400 mg/5 mL Susp suspension Take 30 mL by mouth daily as needed.       No current facility-administered medications for this visit.        Objective:     /64 (Patient Site: Right Arm, Patient Position: Sitting, Cuff Size: Adult Regular)  Pulse 80  Resp 16  Ht 5' 2\" (1.575 m)  " Wt 156 lb (70.8 kg)  BMI 28.53 kg/m2  156 lb (70.8 kg)       Physical Exam:    GENERAL APPEARANCE: alert, no apparent distress.  Answers to questions appropriately, she is in the wheelchair  HEENT: no scleral icterus or xanthelasma  NECK: jugular venous pressure difficult to assess because she is in a wheelchair.  CHEST: symmetric, the lungs are clear to auscultation  CARDIOVASCULAR: regular rhythm without murmur, S4; no carotid bruits  Abdomen: No Organomegaly, masses, bruits, or tenderness. Bowels sounds are present      EXTREMITIES: no cyanosis, clubbing or edema on today's examination.  Multiple neurofibromas.    Cardiac Testing:     Indications      Pulmonary edema       Summary        When compared to the previous study dated 7/14/2016, no significant change.    There is no evidence of a cardiac cause of pulmonary edema on this study.    The estimated left ventricular ejection fraction is 65%.    Left atrial volume is mildly increased.    No significant valve disease.     CONCLUSION:   1. Lexiscan stress nuclear study is negative for inducible myocardial   ischemia or infarction.      COMMENTS:   Comparison with the images of the exam of 07/08/2011 demonstrates no significant interval change.     The patient is at low risk of future cardiac ischemic events based on perfusion imaging.         Lakisha Chanel MD  11/4/2015 3:27 PM             Lab Results:    Lab Results   Component Value Date     02/01/2017    K 5.2 (H) 02/01/2017     (H) 02/01/2017    CO2 22 02/01/2017    BUN 15 02/01/2017    CREATININE 0.59 (L) 02/01/2017    CALCIUM 9.8 02/01/2017     Lab Results   Component Value Date    CHOL 134 07/29/2015    TRIG 169 (H) 07/29/2015    HDL 30 (L) 07/29/2015     BNP (pg/mL)   Date Value   01/18/2017 159   07/14/2016 95   12/08/2015 121     Creatinine (mg/dL)   Date Value   02/01/2017 0.59 (L)   01/24/2017 0.63   01/23/2017 0.69   01/22/2017 0.64     LDL Calculated (mg/dL)   Date Value    07/29/2015 70   03/04/2015 74     Lab Results   Component Value Date    WBC 11.0 02/01/2017    HGB 11.7 (L) 02/01/2017    HCT 34.9 (L) 02/01/2017     (H) 02/01/2017     (H) 02/01/2017               This note has been dictated using voice recognition software. Any grammatical or context distortions are unintentional and inherent to the software.      Garry Maynard M.D., F.A.C..  [facility], [place]  [phone]

## 2021-06-15 NOTE — PATIENT INSTRUCTIONS - HE
Nice to see you today.We talked about monitoring blood pressure and pulse and calling if blood pressure systolic is consistently running less than 105 or heart lower than 60.Please call with dizziness, shortness of breath, fluid retention.My nurse is Shilpi and her number [phone]

## 2021-06-16 NOTE — TELEPHONE ENCOUNTER
Telephone Encounter by Shilpi Basilio RN at 3/15/2019  4:25 PM     Author: Shilpi Basilio RN Service: -- Author Type: Registered Nurse    Filed: 3/15/2019  4:25 PM Encounter Date: 3/15/2019 Status: Signed    : Shilpi Basilio RN (Registered Nurse)       Rufino Keller, PharmD; Garry Maynard MD 5 minutes ago (4:19 PM)      The dose adjustment in November was from Dr Luz - her PCP (primary care provider) at [facility].  [facility]

## 2021-06-19 NOTE — PROGRESS NOTES
[facility] Progress Note    Assessment:  1.  Coronary artery disease with prior bypass surgery.  She has occasional chest discomfort considered to be stable angina.  This is relieved with one sublingual nitroglycerin.  Discussion with her son is her caregiver the recommendations and plan is to continue with medical management but not pursue stress testing.  He will update us if symptoms are more progressive.  We decided to continue with the current combination of medications.  She is not on aspirin and she has been on Xarelto which was started in the hospital 2017 for non-provoked pulmonary embolism.    2.  History of pulmonary embolism.  Will defer follow-up to her primary care physician.  I did however take the liberty of checking a renal function profile today so that the dose determination can be reevaluated.  Currently she is on 20 mg of Xarelto.  They are reminded to avoid nonsteroidal anti-inflammatory medications.  Right ventricular systolic function normal on echocardiography from September 2017.  Pulmonary hypertension on tricuspid study.    3.  Hyperlipidemia.  We are planning a lipid panel with her laboratory studies today.    4.  Son has questions related to depression.  I strongly encouraged him to visit with his primary care physician in this regard.      Plan:    1. CAD (coronary artery disease)  Lipid Profile    Basic metabolic panel   2. Dyslipidemia     3. Atherosclerosis of coronary artery bypass graft of native heart without angina pectoris     4. Von Recklinghausen's disease (NF Type1)     5. Pulmonary embolism and infarction (H)     6. Dementia without behavioral disturbance, unspecified dementia type           An After Visit Summary was printed and given to the patient.    Subjective:    Sarkis Sutton is a 85 y.o. female who returned for a planned  follow up visit.  Her son provides history given issues related to dementia.  He reports that she has occasional chest  discomfort perhaps once or twice a week that is promptly resolved with nitroglycerin.  She has a history of coronary artery disease with remote bypass surgery.  Her most recent stress test is in 2015 were the study was reported as negative for ischemia.  She had an echocardiogram September 2017 with left ventricular function was normal.    I reviewed the chart records and she was hospitalized in January 2017 with acute pulmonary embolism.  She was started by the hospitalist on Xarelto and it was recommended in the discharge summary that she remain on the medication indefinitely.  I trust that this is being followed by her primary care physician but I did suggest that she have a renal profile checked so that we can determine if the current dose of Xarelto is appropriate.  Currently it appears that she is taking 20 mg daily.    Her son notes that she does have an issue related to depression.  Thanks strongly encouraged him to follow-up with Dr. Luz in this regard and I indicated I would be happy to discuss any specific questions he would have is a relates to use of medications as a relates to her cardiac condition.    Son indicates that the patient chooses not to walk and is in a wheelchair.  She has had no specific issues related to bleeding syncope or near syncope.    Review of Systems:   General: Fever  Eyes: WNL  Ears/Nose/Throat: WNL  Lungs: WNL  Heart: Chest Pain  Stomach: Nausea  Bladder: WNL  Muscle/Joints: WNL  Skin: WNL  Nervous System: Daytime Sleepiness  Mental Health: Depression     Blood: WNL      Problem List:    Patient Active Problem List   Diagnosis     Dyslipidemia     Coronary atherosclerosis     Encephalopathy     Rt. Optic nerve glioma     Lt Orbit vascular malformation      Complex partial seizure disorder (H)     Von Recklinghausen's disease (NF Type1)     DJD (degenerative joint disease)     GERD (gastroesophageal reflux disease)     HX: breast cancer     Hearing loss     Moderate  malnutrition (H)     Influenza B     Pulmonary embolism and infarction (H)     Urinary tract infection without hematuria, site unspecified     Pulmonary infarct (H)     CAP (community acquired pneumonia)     Shortness of breath     Generalized weakness     Fatigue     Palliative care encounter     Dementia       Social History     Social History     Marital status:      Spouse name: Claudio     Number of children: 3     Years of education: N/A     Occupational History     retired      Social History Main Topics     Smoking status: Never Smoker     Smokeless tobacco: Never Used     Alcohol use No     Drug use: No     Sexual activity: No     Other Topics Concern     Not on file     Social History Narrative     50 years with 3 grown children and 2 adult grandchildren.    Resides with  in [facility] they own    Retired  and  provider    HS graduate 2yr College degree       Family History   Problem Relation Age of Onset     Cancer Mother      breast     Cancer Father      pancreas       Current Outpatient Prescriptions   Medication Sig Dispense Refill     acetaminophen (TYLENOL) 500 MG tablet Take 500-1,000 mg by mouth every 6 (six) hours as needed for pain.       donepezil (ARICEPT) 10 MG tablet Take 10 mg by mouth at bedtime.       furosemide (LASIX) 40 MG tablet TAKE ONE TABLET BY MOUTH ONE TIME DAILY  90 tablet 1     ipratropium-albuterol (DUONEB) 0.5-2.5 mg/3 mL nebulizer Take 3 mL by nebulization every 6 (six) hours.  0     isosorbide mononitrate (IMDUR) 30 MG 24 hr tablet Take 0.5 tablets (15 mg total) by mouth daily. (Patient taking differently: Take 0.5 tablets by mouth 2 (two) times a day. Indications: Chronic Stable Angina Pectoris)  0     ketoconazole (NIZORAL) 2 % cream Apply 1 application topically every evening.       magnesium hydroxide (MILK OF MAG) 400 mg/5 mL Susp suspension Take 30 mL by mouth daily as needed.       metoprolol tartrate (LOPRESSOR) 25 MG tablet TAKE ONE  "TABLET BY MOUTH TWICE DAILY 180 tablet 1     multivitamin with minerals (THERA-M) 9 mg iron-400 mcg Tab tablet Take 1 tablet by mouth daily.       nitroglycerin (NITROSTAT) 0.4 MG SL tablet Place 1 tablet (0.4 mg total) under the tongue every 5 (five) minutes as needed for chest pain. 25 tablet 6     nystatin (MYCOSTATIN) powder Apply 1 application topically 2 (two) times a day.        omeprazole (PRILOSEC) 20 MG capsule Take 20 mg by mouth 2 (two) times a day as needed.       potassium chloride SA (KLOR-CON M10) 10 MEQ tablet Take 1 tablet (10 mEq total) by mouth daily. 90 tablet 1     rivaroxaban 20 mg Tab Take 1 tablet (20 mg total) by mouth daily with supper. 30 tablet 0     rosuvastatin (CRESTOR) 40 MG tablet TAKE ONE TABLET BY MOUTH ONE TIME DAILY  90 tablet 2     tamoxifen (NOLVADEX) 20 MG tablet Take 20 mg by mouth daily.  11     aspirin 81 MG tablet Take 81 mg by mouth daily.        isosorbide mononitrate (IMDUR) 30 MG 24 hr tablet TAKE ONE TABLET BY MOUTH ONE TIME DAILY 90 tablet 1     No current facility-administered medications for this visit.        Objective:     /56 (Patient Site: Right Arm, Patient Position: Sitting, Cuff Size: Adult Regular)  Pulse 72  Resp 14  Ht 5' 2\" (1.575 m)  Wt 134 lb (60.8 kg)  BMI 24.51 kg/m2  134 lb (60.8 kg)       Physical Exam:    GENERAL APPEARANCE: alert, no apparent distress, she has difficulty with recall related to dementia.  HEENT: no scleral icterus or xanthelasma  NECK: jugular venous pressure. examined in the wheelchair.  CHEST: symmetric, the lungs are clear to auscultation  CARDIOVASCULAR: regular rhythm without murmur, S4, chest wall scar well-healed.; no carotid bruits  Abdomen: No Organomegaly, masses, bruits, or tenderness. Bowels sounds are present      EXTREMITIES: no cyanosis, clubbing or edema multiple neurofibromas.    Cardiac Testing:  Reviewed By      Chanel Jaramillo RN on 9/19/2017  1:18 PM     Garry Maynard MD on 9/15/2017  5:28 PM "       Echo Complete   Order# [id number]    Reading physician: Sebastien Shine MD (Ted)   Ordering physician: Garry Maynard MD   Study date: 9/15/17         Patient Information      Patient Name MRN Sex              Age     Sarkis Sutton [id number] Female 1932 (85 y.o.)       Indications      Dx: CAD (coronary artery disease) [I25.10 (ICD-10-CM)]       Summary        Left ventricle ejection fraction is normal. The calculated left ventricular ejection fraction is 58%.    No hemodynamically significant valvular heart abnormalities.    When compared to the previous study dated 2017, no significant change.       CONCLUSION:   1. Lexiscan stress nuclear study is negative for inducible myocardial   ischemia or infarction.      COMMENTS:   Comparison with the images of the exam of 2011 demonstrates no significant interval change.     The patient is at low risk of future cardiac ischemic events based on perfusion imaging.         Lakisha Chanel MD  2015 3:27 PM      Lab Results:    Lab Results   Component Value Date     2018    K 3.8 2018     (H) 2018    CO2 23 2018    BUN 16 (H) 2017    CREATININE 0.68 2017    CALCIUM 9.8 2017     Lab Results   Component Value Date    CHOL 134 2015    TRIG 169 (H) 2015    HDL 30 (L) 2015     BNP (pg/mL)   Date Value   2017 145   2017 159   2016 95     Creatinine (mg/dL)   Date Value   2017 0.68   2017 0.59 (L)   2017 0.63   2017 0.69     LDL Calculated (mg/dL)   Date Value   2015 70   2015 74     Lab Results   Component Value Date    WBC 11.0 2017    HGB 11.7 (L) 2017    HCT 34.9 (L) 2017     (H) 2017     (H) 2017               This note has been dictated using voice recognition software. Any grammatical or context distortions are unintentional and inherent to the  software.      Garry Maynard M.D., F.A.C.[facility]  [phone]

## 2021-06-25 NOTE — PATIENT INSTRUCTIONS - HE
Please call my nurse Shilpi with any heart related questions.We talked about calling your insurance if you want to explore a home INR machine. I will be in touch with the blood work. My nurse is Shilpi and her number is [phone]

## 2021-06-25 NOTE — TELEPHONE ENCOUNTER
I missed the dose adjustment recommendation from back in November.  However based on normal kidney function and indication of prevention of pulmonary embolism with goal of long-term anticoagulation, current dosing guidelines per package insert:       Pulmonary embolism, Treatment or secondary prophylaxis following initial 6 months of treatment  Treatment (CrCl 30 mL/ min or greater), initial, 15 mg orally twice daily for 21 days followed by 20 mg orally once daily; take with food (FDA dosage) [2]

## 2021-06-25 NOTE — TELEPHONE ENCOUNTER
[facility] states PCP prescribed Xarelto, 15 mg tablets (take 1 tablet every evening) on 11/30/19 and the Rx was last filled 12/1/18.    8/1/18 OV with MDG: I reviewed the chart records and she was hospitalized in January 2017 with acute pulmonary embolism.  She was started by the hospitalist on Xarelto and it was recommended in the discharge summary that she remain on the medication indefinitely.  I trust that this is being followed by her primary care physician but I did suggest that she have a renal profile checked so that we can determine if the current dose of Xarelto is appropriate.  Currently it appears that she is taking 20 mg daily.  Notes Recorded by Garry Maynard MD on 8/1/2018 at 5:24 PM  Cholesterol  and laboratories numbers look good ok to send a letter ty mdg    11/30/18 OV with PCP: Dr Maynard would like for Xarelto to be decreased to 15 mg son is wondering if this is a good treatment    3/14/19 OV with MDG: She was hospitalized January 2017 for pulmonary embolism and has been on Xarelto.  The discharge summary indicated that they recommended Xarelto indefinitely.  I will defer this decision and dose of Xarelto to primary care physician Dr. Luz.   Notes recorded by Garry Maynard MD on 3/15/2019 at 12:04 PM CDT  Labs look ok, normal thyroid, normal kidney function.  Rufino philip is a woman with coronary disease, elderly with dementia. She is on 15mg of xarelto because of prior pulmonary embolism per her primary. Is 15mg reasonable or would 20mg be more appropriate? Thanks for your assistance  mdg    Update sent to Dr Maynard.  [facility]

## 2021-06-25 NOTE — PROGRESS NOTES
[facility] Progress Note    Assessment:  1.  Coronary artery disease with infrequent chest discomfort and history of prior bypass surgery.  She does have progressive dementia.  We are going to continue with the current combination of medications.  I did request some follow-up laboratory studies because she is on a combination of furosemide and potassium.    2.  History of pulmonary embolism.  She is on Xarelto currently 15 mg daily.  Will defer dosing and follow-up to her primary care physician.  Son reports that she has had no issues with respect to falling and is in a wheelchair.    3.  Risk factor modification.  He is on rosuvastatin.  Her most recent LDL cholesterol 70 in 2000 and normal liver function tests.    4.  Son reports that she is cold a lot of the time.  I took the liberty of checking a TSH and asked him to be in touch with her primary care physician to discuss further options if symptoms continue.      Plan: As outlined above with follow-up in 6 months.    1. Coronary atherosclerosis of native coronary artery  isosorbide mononitrate (IMDUR) 30 MG 24 hr tablet    furosemide (LASIX) 40 MG tablet    Basic metabolic panel    Thyroid Stimulating Hormone (TSH)   2. Fatigue  Thyroid Stimulating Hormone (TSH)   3. Dementia           An After Visit Summary was printed and given to the patient.    Subjective:    Sarkis Sutton is a 86 y.o. female who returned for a planned  follow up visit.  She is accompanied by her son who provides most of the history and provides excellent care.  She has been diagnosed with dementia.  She has a history of coronary artery disease with prior bypass surgery.  He reports to me that chest discomfort symptoms are infrequent and short-lived.  Reports that perhaps 1 time per month she mentions chest discomfort and takes 1 sublingual nitroglycerin.    We discussed follow-up stress testing but given infrequent angina the plan was to continue with medical  management especially given her dementia.  She was hospitalized January 2017 for pulmonary embolism and has been on Xarelto.  The discharge summary indicated that they recommended Xarelto indefinitely.  I will defer this decision and dose of Xarelto to primary care physician Dr. Luz.  She does have a history of seizure disorder and Von Recklinghausen's disease.  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      Complex partial seizure disorder (H)     Von Recklinghausen's disease (NF Type1)     DJD (degenerative joint disease)     GERD (gastroesophageal reflux disease)     HX: breast cancer     Hearing loss     Moderate malnutrition (H)     Influenza B     Pulmonary embolism and infarction (H)     Urinary tract infection without hematuria, site unspecified     Pulmonary infarct (H)     CAP (community acquired pneumonia)     Shortness of breath     Generalized weakness     Fatigue     Palliative care encounter     Dementia       Social History     Socioeconomic History     Marital status:      Spouse name: Claudio     Number of children: 3     Years of education: Not on file     Highest education level: Not on file   Occupational History     Occupation: retired   Social Needs     Financial resource strain: Not on file     Food insecurity:     Worry: Not on file     Inability: Not on file     Transportation needs:     Medical: Not on file     Non-medical: Not on file   Tobacco Use     Smoking status: Never Smoker     Smokeless tobacco: Never Used   Substance and Sexual Activity     Alcohol use: No      Drug use: No     Sexual activity: No   Lifestyle     Physical activity:     Days per week: Not on file     Minutes per session: Not on file     Stress: Not on file   Relationships     Social connections:     Talks on phone: Not on file     Gets together: Not on file     Attends Sikh service: Not on file     Active member of club or organization: Not on file     Attends meetings of clubs or organizations: Not on file     Relationship status: Not on file     Intimate partner violence:     Fear of current or ex partner: Not on file     Emotionally abused: Not on file     Physically abused: Not on file     Forced sexual activity: Not on file   Other Topics Concern     Not on file   Social History Narrative     50 years with 3 grown children and 2 adult grandchildren.    Resides with  in [facility] they own    Retired  and  provider    HS graduate 2yr College degree       Family History   Problem Relation Age of Onset     Cancer Mother         breast     Cancer Father         pancreas       Current Outpatient Medications   Medication Sig Dispense Refill     acetaminophen (TYLENOL) 500 MG tablet Take 500-1,000 mg by mouth every 6 (six) hours as needed for pain.       donepezil (ARICEPT) 10 MG tablet Take 10 mg by mouth at bedtime.       furosemide (LASIX) 40 MG tablet Take 1 tablet (40 mg total) by mouth daily. 90 tablet 3     isosorbide mononitrate (IMDUR) 30 MG 24 hr tablet Take 0.5 tablets (15 mg total) by mouth 2 (two) times a day. 90 tablet 4     ketoconazole (NIZORAL) 2 % cream Apply 1 application topically every evening.       KLOR-CON M10 10 mEq tablet Take 1 tablet by mouth daily. 90 tablet 2     levETIRAcetam (KEPPRA) 100 mg/mL solution Take 750 mg by mouth 2 (two) times a day.       metoprolol tartrate (LOPRESSOR) 25 MG tablet TAKE ONE TABLET BY MOUTH TWICE DAILY 180 tablet 0     multivitamin with minerals (THERA-M) 9 mg iron-400 mcg Tab tablet Take 1 tablet by mouth daily.        "nitroglycerin (NITROSTAT) 0.4 MG SL tablet Place 1 tablet (0.4 mg total) under the tongue every 5 (five) minutes as needed for chest pain. 25 tablet 6     nystatin (MYCOSTATIN) powder Apply 1 application topically 2 (two) times a day.        rivaroxaban 20 mg Tab Take 1 tablet (20 mg total) by mouth daily with supper. (Patient taking differently: Take 15 mg by mouth daily with supper.       ) 30 tablet 0     rosuvastatin (CRESTOR) 40 MG tablet TAKE ONE TABLET BY MOUTH ONE TIME DAILY  90 tablet 2     tamoxifen (NOLVADEX) 20 MG tablet Take 20 mg by mouth daily.  11     aspirin 81 MG tablet Take 81 mg by mouth daily.        ipratropium-albuterol (DUONEB) 0.5-2.5 mg/3 mL nebulizer Take 3 mL by nebulization every 6 (six) hours.  0     isosorbide mononitrate (IMDUR) 30 MG 24 hr tablet Take 0.5 tablets (15 mg total) by mouth 2 (two) times a day. 90 tablet 1     magnesium hydroxide (MILK OF MAG) 400 mg/5 mL Susp suspension Take 30 mL by mouth daily as needed.       omeprazole (PRILOSEC) 20 MG capsule Take 20 mg by mouth 2 (two) times a day as needed.       No current facility-administered medications for this visit.        Objective:     /72 (Patient Site: Left Arm, Patient Position: Sitting, Cuff Size: Adult Regular)   Pulse 68   Resp 16   Ht 5' 2\" (1.575 m)   Wt 118 lb (53.5 kg)   BMI 21.58 kg/m    118 lb (53.5 kg)       Physical Exam:    GENERAL APPEARANCE: She is wrapped in a blanket and falls asleep but awakens and reports no specific symptoms., no apparent distress  HEENT: no scleral icterus or xanthelasma  NECK: jugular venous pressure difficult to assess that she is examined in the chair.  She is in a wheelchair.  CHEST: symmetric, the lungs are clear to auscultation  CARDIOVASCULAR: regular rhythm without murmur, click, or gallop; no carotid bruits  Abdomen: No Organomegaly, masses, bruits, or tenderness. Bowels sounds are present      EXTREMITIES: no cyanosis, clubbing or edema multiple " neurofibromas.    Cardiac Testing:  Reading physician: Sebastien Shine MD (Ted) Ordering physician: Garry Maynard MD Study date: 9/15/17   Performing Date Performing Department   Sep 15, 2017  CARDIAC TESTING [[id number]]   Patient Information     Patient Name  Sarkis Sutton MRN  [id number] Sex  Female              Age  1932 (86 y.o.)   Indications     Dx: CAD (coronary artery disease) [I25.10 (ICD-10-CM)]   Summary       Left ventricle ejection fraction is normal. The calculated left ventricular ejection fraction is 58%.    No hemodynamically significant valvular heart abnormalities.    When compared to the previous study dated 2017, no significant change.     CONCLUSION:   1. Lexiscan stress nuclear study is negative for inducible myocardial   ischemia or infarction.      COMMENTS:   Comparison with the images of the exam of 2011 demonstrates no significant interval change.     The patient is at low risk of future cardiac ischemic events based on perfusion imaging.  17 0742    Resulting lab: HE MUSE   Value: Normal sinus rhythm   Nonspecific ST abnormality  in anterior lead.   Abnormal ECG   When compared with ECG of 29-AUG-2016 22:32,   Nonspecific ST and T wave abnormality noted in anterior leads.     Confirmed by CARINA SOLOMON MD LOC: ([id number]) on 2017 4:09:23 PM          Lab Results:    Lab Results   Component Value Date     2018    K 3.6 2018     (H) 2018    CO2 26 2018    BUN 14 2018    CREATININE 0.62 2018    CALCIUM 9.6 2018     Lab Results   Component Value Date    CHOL 132 2018    TRIG 142 2018    HDL 34 (L) 2018     BNP (pg/mL)   Date Value   2017 145   2017 159   2016 95     Creatinine (mg/dL)   Date Value   2018 0.62   2017 0.68   2017 0.59 (L)   2017 0.63     LDL Calculated (mg/dL)   Date Value   2018 70   2015 70    03/04/2015 74     Lab Results   Component Value Date    WBC 11.0 02/01/2017    HGB 11.7 (L) 02/01/2017    HCT 34.9 (L) 02/01/2017     (H) 02/01/2017     (H) 02/01/2017               This note has been dictated using voice recognition software. Any grammatical or context distortions are unintentional and inherent to the software.      Garry Maynard M.D., F.A.C.C.  [facility] [place]  [phone]

## 2021-06-25 NOTE — TELEPHONE ENCOUNTER
----- Message from Rufino Davis, PharmD sent at 3/15/2019  3:24 PM CDT -----  From what I can tell, this should be 20 mg daily Xarelto.  It may be worth following up with the pharmacy to see which she has been feeling most recently if there is question about whether this is accurate or not.  I am not sure how she would have gotten 15 mg tablets as all of the previous notes from last August when Dr. Maynard saw her she was on 20 mg once daily with supper.    Jo Ann   ----- Message -----  From: Garry Maynard MD  Sent: 3/15/2019  12:04 PM  To: Shilpi Basilio RN, Rufino Davis, PharmD    Labs look ok, normal thyroid, normal kidney function.  Rufino philip is a woman with coronary disease, elderly with dementia. She is on 15mg of xarelto because of prior pulmonary embolism per her primary. Is 15mg reasonable or would 20mg be more appropriate? Thanks for your assistance  josue

## 2021-06-29 NOTE — PROGRESS NOTES
"Progress Notes by Garry Maynard MD at 7/2/2020  1:50 PM     Author: Garry Maynard MD Service: -- Author Type: Physician    Filed: 7/2/2020  2:16 PM Encounter Date: 7/2/2020 Status: Signed    : Garry Maynard MD (Physician)           The patient has been notified of following:     \"This telephone visit will be conducted via a call between you and your physician/provider. We have found that certain health care needs can be provided without the need for a physical exam.  This service lets us provide the care you need with a phone conversation.  If a prescription is necessary we can send it directly to your pharmacy.  If lab work is needed we can place an order for that and you can then stop by our lab to have the test done at a later time. If during the course of the call the physician/provider feels a telephone visit is not appropriate, you will not be charged for this service.\" Verbal consent has been obtained for this service by care team member:         HEART CARE PHONE ENCOUNTER        The patient has chosen to have the visit conducted as a telephone visit, to reduce risk of exposure given the current status of Coronavirus in our community. This telephone visit is being conducted via a call between the patient and physician/provider. Health care needs are being provided without a physical exam.     Assessment/Recommendations   Assessment:    1.  Coronary artery disease with no reported complaints of anginal chest discomfort  Patient continues with the current combination of medications including metoprolol and isosorbide mononitrate.  She has utilized rare sublingual nitroglycerin per discussion with her son.  She is going to continue with the current combination of medications I did renew the rosuvastatin, furosemide, and isosorbide.  2.  History of pulmonary embolism.  She has been on Xarelto 15 mg daily.  I will defer additional decisions regarding anticoagulation related to the pulmonary embolism " to her primary care provider.  I had previously been in touch with her previous primary care physician who is since retired.  3.  Risk factor modification.  She is on rosuvastatin.  Most recent lipids are from August 2018 at which time LDL was 70, cholesterol 132, triglycerides 142.  Liver tests normal Fabry 2020.  Plan:  1.  Follow-up 6 months or sooner if specific issues were to arise.  2.  I asked that laboratory studies to be requested with her next upcoming follow-up with her primary care physician which her son reports will likely be scheduled in the next month or so.      Follow Up Plan: Follow up in 6 months  I have reviewed the note as documented.  This accurately captures the substance of my conversation with the patient.    Total time of call between patient and provider was 15   minutes   Start Time:145   Stop Time:200       History of Present Illness/Subjective    Sarkis Sutton is a 87 y.o. female who is being evaluated via a billable telephone visit.    I visited with her son and the patient.  Most of discussion was with her son given the patient's underlying dementia and diminished hearing acuity.  I learned today that she is not describing any anginal chest discomfort and has been stable with the current combination of medications.  She is not reporting shortness of breath, orthopnea, PND, dizziness or significant lower extremity edema.  I did review the primary care notes.  I had previously been in correspondence with Dr. Luz who is since retired about the 3 of pulmonary embolism and the utilization of Xarelto.  It appears that the recommendation of the primary care provider was to continue with the 15 mg dose of Xarelto.  She has not been on aspirin.  She does have a history of coronary artery disease.  She has a history of bypass surgery a number of years ago with coronary angiography in 2009 demonstrating patent LIMA to the LAD and patent vein graft to the posterior descending small to  moderate size diagonal branch that had stenosis but because of the acute angle medical management was pursued.  Most recent stress test is  that was reported negative for ischemia and most recent echocardiogram was from 2017 with normal ejection fraction and no significant valve abnormality at that time.      Echo Complete   Order# [id number]   Reading physician: Sebastien Shine MD (Ted)  Ordering physician: Garry Maynard MD  Study date: 9/15/17    Performing Date  Performing Department    Sep 15, 2017   CARDIAC TESTING [[id number]]    Patient Information     Patient Name   Sarkis Sutton  MRN   [id number]  Sex   Female   1   1932 (85 y.o.)    Indications     Dx: CAD (coronary artery disease) [I25.10 (ICD-10-CM)]    Summary       Left ventricle ejection fraction is normal. The calculated left ventricular ejection fraction is 58%.    No hemodynamically significant valvular heart abnormalities.    When compared to the previous study dated 2017, no significant change.          NM Pharmacologic Stress Test (Order [id number])   Imaging   Date: 2015  Department: [facility]  Released By: Jennifer Covarrubias  Authorizing: Garry Maynard MD    Study Result     [facility]   Pharmacological Stress Test Report     Patient: David Sutton   MRN: [id number]  : 1932  Primary Care Provider: Ranjeet Luz MD  Ordering Physician: Garry Maynard MD  Indication: CAD (coronary artery disease) [I25.10] Chest pain chronic, low to mod probability of CAD        STRESS SUMMARY: 0.4 mg of intravenous Lexiscan was administered over 15 seconds under the supervision of Dr. Lakisha Chanel . No cardiac symptoms were reported. The stress electrocardiogram was negative for inducible ischemia.      TECHNICAL COMMENTS: Nuclear imaging was accomplished utilizing 4.27 mCi of   thallium-201 injected at rest and 32.2 mCi of 99m technetium  sestamibi injected at   the completion of Lexiscan infusion. The  images are satisfactory.      FINDINGS: The Lexiscan stress and rest images demonstrate normal left ventricular   size and tracer uptake pattern. The gated images reveal normal resting regional   wall motion and global systolic performance. The measured resting ejection fraction   is > 70%.      CONCLUSION:   1. Lexiscan stress nuclear study is negative for inducible myocardial   ischemia or infarction.      COMMENTS:   Comparison with the images of the exam of 07/08/2011 demonstrates no significant interval change.      MUSE DIAGNOSIS   ECG 12 lead nursing unit performed   Collected:  01/18/17 0742    Result status:  Final    Resulting lab:  HE MUSE    Value:  Normal sinus rhythm   Nonspecific ST abnormality  in anterior lead.   Abnormal ECG   When compared with ECG of 29-AUG-2016 22:32,   Nonspecific ST and T wave abnormality noted in anterior leads.     Confirmed by CARINA SOLOMON MD LOC:SJ ([id number]) on 1/18/2017 4:09:23         I have reviewed and updated the patient's Past Medical History, Social History, Family History and Medication List.     Physical Examination not performed given phone encounter Review of Systems                                                Medical History  Surgical History Family History Social History   Past Medical History:   Diagnosis Date   ? Cancer (H)     breast   ? Cataract     r eye with lid lag   ? Cognitive decline    ? Complex partial seizure disorder (H) 7/15/2016    Dysrhythmia grade III Lt Temporal   ? Coronary artery disease    ? Hyperlipidemia    ? Influenza B 01/18/2017   ? Pulmonary embolism (H) 01/18/2017   ? 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 Tremor     chronic, now w/c bound     Past Surgical History:   Procedure Laterality Date   ? CARDIAC CATHETERIZATION     ? CHOLECYSTECTOMY      per son   ? MASTECTOMY     ? [place] CABG, VEIN, SINGLE      Description: CABG (CABG);  Proc Date:  10/30/2007;  Comments: LIMA to Distal LAD; ; SVG to PDA; ; Both grafts patent on Angiogram 9/09   ? ROTATOR CUFF REPAIR Bilateral     Family History   Problem Relation Age of Onset   ? Cancer Mother         breast   ? Cancer Father         pancreas    Social History     Socioeconomic History   ? Marital status:      Spouse name: Claudio   ? Number of children: 3   ? Years of education: Not on file   ? Highest education level: Not on file   Occupational History   ? Occupation: retired   Social Needs   ? Financial resource strain: Not on file   ? Food insecurity     Worry: Not on file     Inability: Not on file   ? Transportation needs     Medical: Not on file     Non-medical: Not on file   Tobacco Use   ? Smoking status: Never Smoker   ? Smokeless tobacco: Never Used   Substance and Sexual Activity   ? Alcohol use: No   ? Drug use: No   ? Sexual activity: Never   Lifestyle   ? Physical activity     Days per week: Not on file     Minutes per session: Not on file   ? Stress: Not on file   Relationships   ? Social connections     Talks on phone: Not on file     Gets together: Not on file     Attends [employer] service: Not on file     Active member of club or organization: Not on file     Attends meetings of clubs or organizations: Not on file     Relationship status: Not on file   ? Intimate partner violence     Fear of current or ex partner: Not on file     Emotionally abused: Not on file     Physically abused: Not on file     Forced sexual activity: Not on file   Other Topics Concern   ? Not on file   Social History Narrative     50 years with 3 grown children and 2 adult grandchildren.    Resides with  in [facility] they own    Retired  and  provider     graduate 2yr College degree          Medications  Allergies   Current Outpatient Medications   Medication Sig Dispense Refill   ? acetaminophen (TYLENOL) 500 MG tablet Take 500-1,000 mg by mouth every 6 (six) hours as needed for  pain.     ? donepezil (ARICEPT) 10 MG tablet Take 10 mg by mouth at bedtime.     ? furosemide (LASIX) 40 MG tablet Take 1 tablet (40 mg total) by mouth daily. 90 tablet 3   ? ketoconazole (NIZORAL) 2 % cream Apply 1 application topically every evening.     ? levETIRAcetam (KEPPRA) 100 mg/mL solution Take 750 mg by mouth 2 (two) times a day.     ? metoprolol tartrate (LOPRESSOR) 25 MG tablet Take 1 tablet (25 mg total) by mouth 2 (two) times a day. 180 tablet 0   ? multivitamin with minerals (THERA-M) 9 mg iron-400 mcg Tab tablet Take 1 tablet by mouth daily.     ? nitroglycerin (NITROSTAT) 0.4 MG SL tablet Place 1 tablet (0.4 mg total) under the tongue every 5 (five) minutes as needed for chest pain. 25 tablet 6   ? nystatin (MYCOSTATIN) powder Apply 1 application topically as needed.      ? omeprazole (PRILOSEC) 20 MG capsule Take 20 mg by mouth 2 (two) times a day as needed.     ? potassium chloride (KLOR-CON M10) 10 MEQ tablet Take 1 tablet (10 mEq total) by mouth daily. 90 tablet 0   ? rivaroxaban 20 mg Tab Take 1 tablet (20 mg total) by mouth daily with supper. (Patient taking differently: Take 15 mg by mouth daily with supper.       ) 30 tablet 0   ? rosuvastatin (CRESTOR) 40 MG tablet Take 1 tablet (40 mg total) by mouth daily. 90 tablet 3   ? ipratropium-albuterol (DUONEB) 0.5-2.5 mg/3 mL nebulizer Take 3 mL by nebulization every 6 (six) hours.  0   ? isosorbide mononitrate (IMDUR) 30 MG 24 hr tablet Take 0.5 tablets (15 mg total) by mouth 2 (two) times a day. 90 tablet 4   ? magnesium hydroxide (MILK OF MAG) 400 mg/5 mL Susp suspension Take 30 mL by mouth daily as needed.     ? tamoxifen (NOLVADEX) 20 MG tablet Take 20 mg by mouth daily.  11     No current facility-administered medications for this visit.     Allergies   Allergen Reactions   ? Biaxin [Clarithromycin] Swelling     Neck Swelling         Lab Results    Chemistry/lipid CBC Cardiac Enzymes/BNP/TSH/INR   Lab Results   Component Value Date    CHOL  132 08/01/2018    HDL 34 (L) 08/01/2018    LDLCALC 70 08/01/2018    TRIG 142 08/01/2018    CREATININE 0.61 02/04/2020    BUN 13 02/04/2020    K 3.9 02/04/2020     02/04/2020     (H) 02/04/2020    CO2 24 02/04/2020    Lab Results   Component Value Date    WBC 11.0 02/01/2017    HGB 11.7 (L) 02/01/2017    HCT 34.9 (L) 02/01/2017     (H) 02/01/2017     (H) 02/01/2017    Lab Results   Component Value Date    TROPONINI 0.02 01/19/2017     09/06/2017    TSH 2.60 03/14/2019    INR 1.47 (H) 01/19/2017        Garry Maynard

## 2021-06-30 NOTE — PROGRESS NOTES
Progress Notes by Garry Maynard MD at 3/3/2021  1:10 PM     Author: Garry Maynard MD Service: -- Author Type: Physician    Filed: 3/3/2021  1:43 PM Encounter Date: 3/3/2021 Status: Signed    : Garry Maynard MD (Physician)             [facility] Progress Note    Assessment:  1.  Coronary artery disease.  Patient unable to provide history but son who takes good care of her he Sandra that she is not had any complaints of chest pain earlier no use of nitroglycerin.  We elected continue with the current combination of medications although I did ask that he monitor pulse and blood pressure intermittently.  Reports that typically blood pressures are in the 118/60 range and heart rates in the 60s.  Would eventually consider decreasing the dose of metoprolol if vital signs warrant.  He is given my nurses telephone number.  We will continue with the isosorbide.    2.  History of pulmonary embolism.  She has been on Xarelto 15 mg daily.  Follow-up related to this condition to her primary care provider.  She was admitted per review of the chart record January 2017 with acute pulmonary embolism found to have DVT and chronic anticoagulation was recommended.    3.  Heart failure with preserved ejection fraction.  No findings to suggest volume overload today.  She is going to continue with the current dose of furosemide and potassium.  She has very mild lower extremity edema.  Recent laboratory studies are reviewed.  Laboratory studies from February 16 demonstrated stable chemistries and potassium.    4.  Dyslipidemia.  The most recent lipids are from August 2018.  Would consider having repeat lipids when she sees her primary care provider in follow-up.  LDL at that time was 70.  Remains on rosuvastatin 40 mg daily.  Liver tests recently obtained were within normal limits.          Plan: As outlined above with plan follow-up in 1 year.    1. Pulmonary embolism and infarction (H)     2.  Hypokalemia  potassium chloride (K-DUR,KLOR-CON) 10 MEQ tablet   3. Coronary atherosclerosis of native coronary artery  metoprolol tartrate (LOPRESSOR) 25 MG tablet   4. Coronary atherosclerosis  nitroglycerin (NITROSTAT) 0.4 MG SL tablet   5. Dyslipidemia           An After Visit Summary was printed and given to the patient.    Subjective:    Sarkis Sutton is a 88 y.o. female who returned for a planned  follow up visit.  She does have a history of coronary artery disease.  She has a history of bypass surgery a number of years ago with coronary angiography in 2009 demonstrating patent LIMA to the LAD and patent vein graft to the posterior descending small to moderate size diagonal branch that had stenosis but because of the acute angle medical management was pursued.  Most recent stress test is 2015 that was reported negative for ischemia and most recent echocardiogram was from 2017 with normal ejection fraction and no significant valve abnormality at that time.  She is quite limited in her ability to give history.  In fact she sleeps throughout most of our visit but is easily arousable.  She has a history of dementia and her son takes very good care of her.  He reports to me today that he is not to find any symptoms of angina, shortness of breath, dizziness or lightheadedness.  He administers her medications.  I have reviewed recent notes from her primary care provider.       Review of Systems:   General: WNL  Eyes: WNL  Ears/Nose/Throat: WNL  Lungs: WNL  Heart: Leg Swelling  Stomach: WNL  Bladder: WNL  Muscle/Joints: WNL  Skin: WNL  Nervous System: Daytime Sleepiness  Mental Health: WNL     Blood: WNL      Problem List:    Patient Active Problem List   Diagnosis   ? Dyslipidemia   ? Coronary atherosclerosis   ? Encephalopathy   ? Rt. Optic nerve glioma   ? Lt Orbit vascular malformation    ? Complex partial seizure disorder (H)   ? Von Recklinghausen's disease (NF Type1)   ? DJD (degenerative joint disease)    ? GERD (gastroesophageal reflux disease)   ? HX: breast cancer   ? Hearing loss   ? Moderate malnutrition (H)   ? Influenza B   ? Pulmonary embolism and infarction (H)   ? Urinary tract infection without hematuria, site unspecified   ? Pulmonary infarct (H)   ? CAP (community acquired pneumonia)   ? Shortness of breath   ? Generalized weakness   ? Fatigue   ? Palliative care encounter   ? Dementia (H)       Social History     Socioeconomic History   ? Marital status:      Spouse name: Claudio   ? Number of children: 3   ? Years of education: Not on file   ? Highest education level: Not on file   Occupational History   ? Occupation: retired   Social Needs   ? Financial resource strain: Not on file   ? Food insecurity     Worry: Not on file     Inability: Not on file   ? Transportation needs     Medical: Not on file     Non-medical: Not on file   Tobacco Use   ? Smoking status: Never Smoker   ? Smokeless tobacco: Never Used   Substance and Sexual Activity   ? Alcohol use: No   ? Drug use: No   ? Sexual activity: Never   Lifestyle   ? Physical activity     Days per week: Not on file     Minutes per session: Not on file   ? Stress: Not on file   Relationships   ? Social connections     Talks on phone: Not on file     Gets together: Not on file     Attends [employer] service: Not on file     Active member of club or organization: Not on file     Attends meetings of clubs or organizations: Not on file     Relationship status: Not on file   ? Intimate partner violence     Fear of current or ex partner: Not on file     Emotionally abused: Not on file     Physically abused: Not on file     Forced sexual activity: Not on file   Other Topics Concern   ? Not on file   Social History Narrative     50 years with 3 grown children and 2 adult grandchildren.    Resides with  in [facility] they own    Retired  and  provider    [employer] graduate 2yr College degree       Family History   Problem Relation Age  of Onset   ? Cancer Mother         breast   ? Cancer Father         pancreas       Current Outpatient Medications   Medication Sig Dispense Refill   ? acetaminophen (TYLENOL) 500 MG tablet Take 500-1,000 mg by mouth every 6 (six) hours as needed for pain.     ? donepezil (ARICEPT) 10 MG tablet Take 10 mg by mouth at bedtime.     ? furosemide (LASIX) 40 MG tablet Take 1 tablet (40 mg total) by mouth daily. 90 tablet 3   ? isosorbide mononitrate (IMDUR) 30 MG 24 hr tablet Take 0.5 tablets (15 mg total) by mouth 2 (two) times a day. 90 tablet 2   ? levETIRAcetam (KEPPRA) 100 mg/mL solution Take 500 mg by mouth 2 (two) times a day.      ? melatonin 5 mg Tab tablet Take 5 mg by mouth at bedtime.      ? metoprolol tartrate (LOPRESSOR) 25 MG tablet Take 1 tablet (25 mg total) by mouth 2 (two) times a day. 180 tablet 3   ? multivitamin with minerals (THERA-M) 9 mg iron-400 mcg Tab tablet Take 1 tablet by mouth daily.     ? nitroglycerin (NITROSTAT) 0.4 MG SL tablet Place 1 tablet (0.4 mg total) under the tongue every 5 (five) minutes as needed for chest pain. 25 tablet 6   ? nystatin (MYCOSTATIN) powder Apply 1 application topically as needed.      ? potassium chloride (K-DUR,KLOR-CON) 10 MEQ tablet Take 1 tablet (10 mEq total) by mouth daily. 90 tablet 1   ? rivaroxaban 20 mg Tab Take 1 tablet (20 mg total) by mouth daily with supper. (Patient taking differently: Take 15 mg by mouth daily with supper.       ) 30 tablet 0   ? rosuvastatin (CRESTOR) 40 MG tablet Take 1 tablet (40 mg total) by mouth daily. 90 tablet 3     No current facility-administered medications for this visit.        Objective:     /64 (Patient Site: Left Arm, Patient Position: Sitting, Cuff Size: Adult Regular)   Pulse (!) 54   Resp 16   Wt 118 lb (53.5 kg) Comment: unable to get  BMI 21.58 kg/m    118 lb (53.5 kg)   [unfilled]  Wt Readings from Last 3 Encounters:   03/03/21 118 lb (53.5 kg)   03/14/19 118 lb (53.5 kg)   08/01/18 134 lb  (60.8 kg)     102/62, heart rate of 60s and regular.  Physical Exam:    GENERAL APPEARANCE: Sleepy but easily arousable., no apparent distress  HEENT: no scleral icterus or xanthelasma  NECK: jugular venous pressure unable to assess.  Patient is in a wheelchair and curled up.  CHEST: symmetric, the lungs are clear to auscultation, mildly diminished.  CARDIOVASCULAR: regular rhythm without observed a significant murmur, click, or gallop; no carotid bruits  Abdomen: No Organomegaly, masses, bruits, or tenderness. Bowels sounds are present      EXTREMITIES: no cyanosis, clubbing or edema, neuro fibromas.  Neuro: Answers 1 word questions but otherwise did interaction.    Cardiac Testing:  Reviewed By    Chanel Jaramillo RN on 2017 13:18   Garry Maynard MD on 9/15/2017 17:28   Echo Complete  Order# [id number]  Reading physician: Sebastien Shine MD (Ted) Ordering physician: Garry Maynard MD Study date: 9/15/17   Performing Date Performing Department   Sep 15, 2017  CARDIAC TESTING [[id number]]   Patient Information    Patient Name   Sarkis Sutton MRN   [id number] Sex   Female  [birth year]   1932 (85 y.o.)   Indications    Dx: CAD (coronary artery disease) [I25.10 (ICD-10-CM)]   Summary      Left ventricle ejection fraction is normal. The calculated left ventricular ejection fraction is 58%.    No hemodynamically significant valvular heart abnormalities.    When compared to the previous study dated 2017, no significant change.        Reviewed By    Chanel Jaramillo RN on 2015 10:32   Garry Maynard MD on 2015 15:57   NM Pharmacologic Stress Test (Order [id number])  Imaging  Date: 2015 Department: [facility] Released By: Jennifer Covarrubias Authorizing: Garry Maynard MD   Study Result    [facility] Medicine   Pharmacological Stress Test Report     Patient: David Sutton   MRN: [id number]  : 1932  Primary Care Provider:  Ranjeet Luz MD  Ordering Physician: Garry Maynard MD  Indication: CAD (coronary artery disease) [I25.10] Chest pain chronic, low to mod probability of CAD        STRESS SUMMARY: 0.4 mg of intravenous Lexiscan was administered over 15 seconds under the supervision of Dr. Lakisha Chanel . No cardiac symptoms were reported. The stress electrocardiogram was negative for inducible ischemia.      TECHNICAL COMMENTS: Nuclear imaging was accomplished utilizing 4.27 mCi of   thallium-201 injected at rest and 32.2 mCi of 99m technetium sestamibi injected at   the completion of Lexiscan infusion. The  images are satisfactory.      FINDINGS: The Lexiscan stress and rest images demonstrate normal left ventricular   size and tracer uptake pattern. The gated images reveal normal resting regional   wall motion and global systolic performance. The measured resting ejection fraction   is > 70%.      CONCLUSION:   1. Lexiscan stress nuclear study is negative for inducible myocardial   ischemia or infarction.      COMMENTS:   Comparison with the images of the exam of 07/08/2011 demonstrates no significant interval change.     The patient is at low risk of future cardiac ischemic events based on perfusion imaging.           Lab Results:    Lab Results   Component Value Date     02/16/2021    K 3.5 02/16/2021     (H) 02/16/2021    CO2 26 02/16/2021    BUN 15 02/16/2021    CREATININE 0.64 02/16/2021    CALCIUM 9.1 02/16/2021     Lab Results   Component Value Date    CHOL 132 08/01/2018    TRIG 142 08/01/2018    HDL 34 (L) 08/01/2018     BNP (pg/mL)   Date Value   09/06/2017 145   01/18/2017 159   07/14/2016 95     Creatinine (mg/dL)   Date Value   02/16/2021 0.64   01/13/2021 0.63   02/04/2020 0.61   03/14/2019 0.69     LDL Calculated (mg/dL)   Date Value   08/01/2018 70   07/29/2015 70   03/04/2015 74     Lab Results   Component Value Date    WBC 11.0 02/01/2017    HGB 11.7 (L) 02/01/2017    HCT  34.9 (L) 02/01/2017     (H) 02/01/2017     (H) 02/01/2017         This note has been dictated using voice recognition software. Any grammatical or context distortions are unintentional and inherent to the software.                          No

## 2021-07-26 DIAGNOSIS — E78.5 HYPERLIPIDEMIA: Primary | ICD-10-CM

## 2021-07-26 RX ORDER — ROSUVASTATIN CALCIUM 40 MG/1
40 TABLET, COATED ORAL DAILY
Qty: 90 TABLET | Refills: 1 | Status: SHIPPED | OUTPATIENT
Start: 2021-07-26 | End: 2021-07-27

## 2021-07-27 DIAGNOSIS — E78.5 HYPERLIPIDEMIA: ICD-10-CM

## 2021-07-27 RX ORDER — ROSUVASTATIN CALCIUM 40 MG/1
40 TABLET, COATED ORAL DAILY
Qty: 90 TABLET | Refills: 1 | Status: SHIPPED | OUTPATIENT
Start: 2021-07-27 | End: 2022-01-01

## 2021-09-13 DIAGNOSIS — I25.10 CORONARY ATHEROSCLEROSIS OF NATIVE CORONARY ARTERY: Primary | ICD-10-CM

## 2021-09-13 RX ORDER — FUROSEMIDE 40 MG
40 TABLET ORAL DAILY
Qty: 90 TABLET | Refills: 1 | Status: SHIPPED | OUTPATIENT
Start: 2021-09-13 | End: 2022-01-01

## 2021-11-08 DIAGNOSIS — E87.6 HYPOKALEMIA: Primary | ICD-10-CM

## 2021-11-08 RX ORDER — POTASSIUM CHLORIDE 750 MG/1
10 TABLET, EXTENDED RELEASE ORAL DAILY
Qty: 90 TABLET | Refills: 1 | Status: SHIPPED | OUTPATIENT
Start: 2021-11-08 | End: 2022-01-01

## 2021-11-30 ENCOUNTER — LAB REQUISITION (OUTPATIENT)
Dept: LAB | Facility: CLINIC | Age: 86
End: 2021-11-30
Payer: MEDICARE

## 2021-11-30 DIAGNOSIS — E78.00 PURE HYPERCHOLESTEROLEMIA, UNSPECIFIED: ICD-10-CM

## 2021-11-30 DIAGNOSIS — I10 ESSENTIAL (PRIMARY) HYPERTENSION: ICD-10-CM

## 2021-11-30 PROCEDURE — 80061 LIPID PANEL: CPT | Mod: ORL | Performed by: PHYSICIAN ASSISTANT

## 2021-11-30 PROCEDURE — 80048 BASIC METABOLIC PNL TOTAL CA: CPT | Mod: ORL | Performed by: PHYSICIAN ASSISTANT

## 2021-12-01 LAB
ANION GAP SERPL CALCULATED.3IONS-SCNC: 9 MMOL/L (ref 5–18)
BUN SERPL-MCNC: 13 MG/DL (ref 8–28)
CALCIUM SERPL-MCNC: 9.9 MG/DL (ref 8.5–10.5)
CHLORIDE BLD-SCNC: 109 MMOL/L (ref 98–107)
CHOLEST SERPL-MCNC: 152 MG/DL
CO2 SERPL-SCNC: 26 MMOL/L (ref 22–31)
CREAT SERPL-MCNC: 0.67 MG/DL (ref 0.6–1.1)
GFR SERPL CREATININE-BSD FRML MDRD: 78 ML/MIN/1.73M2
GLUCOSE BLD-MCNC: 123 MG/DL (ref 70–125)
HDLC SERPL-MCNC: 47 MG/DL
LDLC SERPL CALC-MCNC: 83 MG/DL
POTASSIUM BLD-SCNC: 3.8 MMOL/L (ref 3.5–5)
SODIUM SERPL-SCNC: 144 MMOL/L (ref 136–145)
TRIGL SERPL-MCNC: 112 MG/DL

## 2022-01-01 ENCOUNTER — OFFICE VISIT (OUTPATIENT)
Dept: NEUROLOGY | Facility: CLINIC | Age: 87
End: 2022-01-01
Payer: MEDICARE

## 2022-01-01 ENCOUNTER — TELEPHONE (OUTPATIENT)
Dept: CARDIOLOGY | Facility: CLINIC | Age: 87
End: 2022-01-01
Payer: MEDICARE

## 2022-01-01 ENCOUNTER — OFFICE VISIT (OUTPATIENT)
Dept: CARDIOLOGY | Facility: CLINIC | Age: 87
End: 2022-01-01
Payer: MEDICARE

## 2022-01-01 ENCOUNTER — LAB REQUISITION (OUTPATIENT)
Dept: LAB | Facility: CLINIC | Age: 87
End: 2022-01-01
Payer: MEDICARE

## 2022-01-01 ENCOUNTER — APPOINTMENT (OUTPATIENT)
Dept: CT IMAGING | Facility: HOSPITAL | Age: 87
DRG: 070 | End: 2022-01-01
Attending: STUDENT IN AN ORGANIZED HEALTH CARE EDUCATION/TRAINING PROGRAM
Payer: MEDICARE

## 2022-01-01 ENCOUNTER — APPOINTMENT (OUTPATIENT)
Dept: RADIOLOGY | Facility: HOSPITAL | Age: 87
DRG: 070 | End: 2022-01-01
Attending: STUDENT IN AN ORGANIZED HEALTH CARE EDUCATION/TRAINING PROGRAM
Payer: MEDICARE

## 2022-01-01 ENCOUNTER — APPOINTMENT (OUTPATIENT)
Dept: CARDIOLOGY | Facility: HOSPITAL | Age: 87
DRG: 070 | End: 2022-01-01
Attending: INTERNAL MEDICINE
Payer: MEDICARE

## 2022-01-01 ENCOUNTER — APPOINTMENT (OUTPATIENT)
Dept: CT IMAGING | Facility: HOSPITAL | Age: 87
DRG: 070 | End: 2022-01-01
Attending: INTERNAL MEDICINE
Payer: MEDICARE

## 2022-01-01 ENCOUNTER — LAB (OUTPATIENT)
Dept: CARDIOLOGY | Facility: CLINIC | Age: 87
End: 2022-01-01
Payer: MEDICARE

## 2022-01-01 ENCOUNTER — HOSPITAL ENCOUNTER (INPATIENT)
Facility: HOSPITAL | Age: 87
LOS: 4 days | Discharge: HOSPICE | DRG: 070 | End: 2022-10-27
Attending: STUDENT IN AN ORGANIZED HEALTH CARE EDUCATION/TRAINING PROGRAM | Admitting: INTERNAL MEDICINE
Payer: MEDICARE

## 2022-01-01 ENCOUNTER — DOCUMENTATION ONLY (OUTPATIENT)
Dept: OTHER | Facility: CLINIC | Age: 87
End: 2022-01-01

## 2022-01-01 VITALS
SYSTOLIC BLOOD PRESSURE: 114 MMHG | TEMPERATURE: 98 F | WEIGHT: 120 LBS | RESPIRATION RATE: 18 BRPM | HEIGHT: 62 IN | OXYGEN SATURATION: 91 % | HEART RATE: 99 BPM | BODY MASS INDEX: 22.08 KG/M2 | DIASTOLIC BLOOD PRESSURE: 81 MMHG

## 2022-01-01 VITALS
HEART RATE: 67 BPM | WEIGHT: 120 LBS | DIASTOLIC BLOOD PRESSURE: 68 MMHG | BODY MASS INDEX: 21.95 KG/M2 | SYSTOLIC BLOOD PRESSURE: 136 MMHG

## 2022-01-01 VITALS — DIASTOLIC BLOOD PRESSURE: 96 MMHG | SYSTOLIC BLOOD PRESSURE: 156 MMHG | HEART RATE: 80 BPM | RESPIRATION RATE: 20 BRPM

## 2022-01-01 DIAGNOSIS — F02.80 LATE ONSET ALZHEIMER'S DISEASE WITHOUT BEHAVIORAL DISTURBANCE (H): ICD-10-CM

## 2022-01-01 DIAGNOSIS — E78.5 DYSLIPIDEMIA, GOAL LDL BELOW 70: ICD-10-CM

## 2022-01-01 DIAGNOSIS — I50.32 CHRONIC DIASTOLIC CONGESTIVE HEART FAILURE (H): Primary | ICD-10-CM

## 2022-01-01 DIAGNOSIS — I10 ESSENTIAL HYPERTENSION: ICD-10-CM

## 2022-01-01 DIAGNOSIS — I25.83 CORONARY ARTERY DISEASE DUE TO LIPID RICH PLAQUE: ICD-10-CM

## 2022-01-01 DIAGNOSIS — R41.0 CONFUSION: ICD-10-CM

## 2022-01-01 DIAGNOSIS — G30.1 LATE ONSET ALZHEIMER'S DISEASE WITHOUT BEHAVIORAL DISTURBANCE (H): Primary | ICD-10-CM

## 2022-01-01 DIAGNOSIS — I25.10 CORONARY ARTERY DISEASE DUE TO LIPID RICH PLAQUE: ICD-10-CM

## 2022-01-01 DIAGNOSIS — I50.32 CHRONIC DIASTOLIC CONGESTIVE HEART FAILURE (H): ICD-10-CM

## 2022-01-01 DIAGNOSIS — F02.80 LATE ONSET ALZHEIMER'S DISEASE WITHOUT BEHAVIORAL DISTURBANCE (H): Primary | ICD-10-CM

## 2022-01-01 DIAGNOSIS — R09.02 HYPOXIA: ICD-10-CM

## 2022-01-01 DIAGNOSIS — N76.0 ACUTE VAGINITIS: ICD-10-CM

## 2022-01-01 DIAGNOSIS — G40.209 PARTIAL SYMPTOMATIC EPILEPSY WITH COMPLEX PARTIAL SEIZURES, NOT INTRACTABLE, WITHOUT STATUS EPILEPTICUS (H): ICD-10-CM

## 2022-01-01 DIAGNOSIS — S09.90XA INJURY OF HEAD, INITIAL ENCOUNTER: ICD-10-CM

## 2022-01-01 DIAGNOSIS — G30.1 LATE ONSET ALZHEIMER'S DISEASE WITHOUT BEHAVIORAL DISTURBANCE (H): ICD-10-CM

## 2022-01-01 DIAGNOSIS — I25.10 CORONARY ARTERY DISEASE DUE TO LIPID RICH PLAQUE: Primary | ICD-10-CM

## 2022-01-01 DIAGNOSIS — E87.6 HYPOKALEMIA: ICD-10-CM

## 2022-01-01 DIAGNOSIS — I25.83 CORONARY ARTERY DISEASE DUE TO LIPID RICH PLAQUE: Primary | ICD-10-CM

## 2022-01-01 LAB
ALBUMIN SERPL BCG-MCNC: 3 G/DL (ref 3.5–5.2)
ALBUMIN SERPL BCG-MCNC: 3.8 G/DL (ref 3.5–5.2)
ALBUMIN SERPL-MCNC: 3.5 G/DL (ref 3.5–5)
ALBUMIN UR-MCNC: 10 MG/DL
ALP SERPL-CCNC: 68 U/L (ref 35–104)
ALP SERPL-CCNC: 68 U/L (ref 45–120)
ALP SERPL-CCNC: 78 U/L (ref 35–104)
ALT SERPL W P-5'-P-CCNC: 11 U/L (ref 0–45)
ALT SERPL W P-5'-P-CCNC: 18 U/L (ref 10–35)
ALT SERPL W P-5'-P-CCNC: 25 U/L (ref 10–35)
AMMONIA PLAS-SCNC: <10 UMOL/L (ref 11–51)
ANION GAP SERPL CALCULATED.3IONS-SCNC: 10 MMOL/L (ref 7–15)
ANION GAP SERPL CALCULATED.3IONS-SCNC: 12 MMOL/L (ref 7–15)
ANION GAP SERPL CALCULATED.3IONS-SCNC: 6 MMOL/L (ref 5–18)
ANION GAP SERPL CALCULATED.3IONS-SCNC: 8 MMOL/L (ref 7–15)
ANION GAP SERPL CALCULATED.3IONS-SCNC: 9 MMOL/L (ref 7–15)
APPEARANCE UR: CLEAR
AST SERPL W P-5'-P-CCNC: 17 U/L (ref 0–40)
AST SERPL W P-5'-P-CCNC: 29 U/L (ref 10–35)
AST SERPL W P-5'-P-CCNC: 31 U/L (ref 10–35)
BACTERIA BLD CULT: NO GROWTH
BACTERIA SPEC CULT: ABNORMAL
BACTERIA UR CULT: NORMAL
BACTERIAL VAGINOSIS SMEAR: ABNORMAL
BASOPHILS # BLD AUTO: 0.1 10E3/UL (ref 0–0.2)
BASOPHILS NFR BLD AUTO: 1 %
BILIRUB DIRECT SERPL-MCNC: 0.25 MG/DL (ref 0–0.3)
BILIRUB SERPL-MCNC: 0.7 MG/DL (ref 0–1)
BILIRUB SERPL-MCNC: 1.1 MG/DL
BILIRUB SERPL-MCNC: 1.2 MG/DL
BILIRUB UR QL STRIP: NEGATIVE
BUN SERPL-MCNC: 10.6 MG/DL (ref 8–23)
BUN SERPL-MCNC: 13.3 MG/DL (ref 8–23)
BUN SERPL-MCNC: 13.5 MG/DL (ref 8–23)
BUN SERPL-MCNC: 14.5 MG/DL (ref 8–23)
BUN SERPL-MCNC: 18 MG/DL (ref 8–28)
CALCIUM SERPL-MCNC: 9.1 MG/DL (ref 8.2–9.6)
CALCIUM SERPL-MCNC: 9.2 MG/DL (ref 8.2–9.6)
CALCIUM SERPL-MCNC: 9.5 MG/DL (ref 8.5–10.5)
CALCIUM SERPL-MCNC: 9.6 MG/DL (ref 8.2–9.6)
CALCIUM SERPL-MCNC: 9.7 MG/DL (ref 8.2–9.6)
CHLORIDE BLD-SCNC: 111 MMOL/L (ref 98–107)
CHLORIDE SERPL-SCNC: 100 MMOL/L (ref 98–107)
CHLORIDE SERPL-SCNC: 104 MMOL/L (ref 98–107)
CHLORIDE SERPL-SCNC: 107 MMOL/L (ref 98–107)
CHLORIDE SERPL-SCNC: 109 MMOL/L (ref 98–107)
CO2 SERPL-SCNC: 26 MMOL/L (ref 22–31)
COLOR UR AUTO: ABNORMAL
CREAT SERPL-MCNC: 0.55 MG/DL (ref 0.51–0.95)
CREAT SERPL-MCNC: 0.58 MG/DL (ref 0.51–0.95)
CREAT SERPL-MCNC: 0.59 MG/DL (ref 0.51–0.95)
CREAT SERPL-MCNC: 0.63 MG/DL (ref 0.51–0.95)
CREAT SERPL-MCNC: 0.68 MG/DL (ref 0.6–1.1)
DEPRECATED HCO3 PLAS-SCNC: 24 MMOL/L (ref 22–29)
DEPRECATED HCO3 PLAS-SCNC: 24 MMOL/L (ref 22–29)
DEPRECATED HCO3 PLAS-SCNC: 25 MMOL/L (ref 22–29)
DEPRECATED HCO3 PLAS-SCNC: 26 MMOL/L (ref 22–29)
EOSINOPHIL # BLD AUTO: 0.7 10E3/UL (ref 0–0.7)
EOSINOPHIL NFR BLD AUTO: 6 %
ERYTHROCYTE [DISTWIDTH] IN BLOOD BY AUTOMATED COUNT: 12.5 % (ref 10–15)
ERYTHROCYTE [DISTWIDTH] IN BLOOD BY AUTOMATED COUNT: 12.8 % (ref 10–15)
ERYTHROCYTE [DISTWIDTH] IN BLOOD BY AUTOMATED COUNT: 12.8 % (ref 10–15)
ERYTHROCYTE [DISTWIDTH] IN BLOOD BY AUTOMATED COUNT: 12.9 % (ref 10–15)
FOLATE SERPL-MCNC: 37.6 NG/ML (ref 4.6–34.8)
GFR SERPL CREATININE-BSD FRML MDRD: 83 ML/MIN/1.73M2
GFR SERPL CREATININE-BSD FRML MDRD: 84 ML/MIN/1.73M2
GFR SERPL CREATININE-BSD FRML MDRD: 85 ML/MIN/1.73M2
GFR SERPL CREATININE-BSD FRML MDRD: 85 ML/MIN/1.73M2
GFR SERPL CREATININE-BSD FRML MDRD: 87 ML/MIN/1.73M2
GLUCOSE BLD-MCNC: 79 MG/DL (ref 70–125)
GLUCOSE SERPL-MCNC: 122 MG/DL (ref 70–99)
GLUCOSE SERPL-MCNC: 146 MG/DL (ref 70–99)
GLUCOSE SERPL-MCNC: 81 MG/DL (ref 70–99)
GLUCOSE SERPL-MCNC: 90 MG/DL (ref 70–99)
GLUCOSE UR STRIP-MCNC: NEGATIVE MG/DL
HCT VFR BLD AUTO: 37.9 % (ref 35–47)
HCT VFR BLD AUTO: 38.7 % (ref 35–47)
HCT VFR BLD AUTO: 39.4 % (ref 35–47)
HCT VFR BLD AUTO: 41.2 % (ref 35–47)
HGB BLD-MCNC: 12.4 G/DL (ref 11.7–15.7)
HGB BLD-MCNC: 12.7 G/DL (ref 11.7–15.7)
HGB BLD-MCNC: 13.5 G/DL (ref 11.7–15.7)
HGB BLD-MCNC: 14 G/DL (ref 11.7–15.7)
HGB UR QL STRIP: NEGATIVE
HOLD SPECIMEN: NORMAL
HYALINE CASTS: 1 /LPF
IMM GRANULOCYTES # BLD: 0.1 10E3/UL
IMM GRANULOCYTES NFR BLD: 0 %
INR PPP: 2.1 (ref 0.85–1.15)
KETONES UR STRIP-MCNC: NEGATIVE MG/DL
LEUKOCYTE ESTERASE UR QL STRIP: NEGATIVE
LEVETIRACETAM SERPL-MCNC: 11.2 ΜG/ML (ref 10–40)
LVEF ECHO: NORMAL
LYMPHOCYTES # BLD AUTO: 2.4 10E3/UL (ref 0.8–5.3)
LYMPHOCYTES NFR BLD AUTO: 21 %
MAGNESIUM SERPL-MCNC: 2 MG/DL (ref 1.7–2.3)
MAGNESIUM SERPL-MCNC: 2.1 MG/DL (ref 1.8–2.6)
MCH RBC QN AUTO: 33.7 PG (ref 26.5–33)
MCH RBC QN AUTO: 33.9 PG (ref 26.5–33)
MCH RBC QN AUTO: 33.9 PG (ref 26.5–33)
MCH RBC QN AUTO: 34 PG (ref 26.5–33)
MCHC RBC AUTO-ENTMCNC: 32.7 G/DL (ref 31.5–36.5)
MCHC RBC AUTO-ENTMCNC: 32.8 G/DL (ref 31.5–36.5)
MCHC RBC AUTO-ENTMCNC: 34 G/DL (ref 31.5–36.5)
MCHC RBC AUTO-ENTMCNC: 34.3 G/DL (ref 31.5–36.5)
MCV RBC AUTO: 100 FL (ref 78–100)
MCV RBC AUTO: 103 FL (ref 78–100)
MCV RBC AUTO: 104 FL (ref 78–100)
MCV RBC AUTO: 99 FL (ref 78–100)
MONOCYTES # BLD AUTO: 1.5 10E3/UL (ref 0–1.3)
MONOCYTES NFR BLD AUTO: 13 %
MUCOUS THREADS #/AREA URNS LPF: PRESENT /LPF
NEUTROPHILS # BLD AUTO: 6.8 10E3/UL (ref 1.6–8.3)
NEUTROPHILS NFR BLD AUTO: 59 %
NITRATE UR QL: NEGATIVE
NRBC # BLD AUTO: 0 10E3/UL
NRBC BLD AUTO-RTO: 0 /100
NT-PROBNP SERPL-MCNC: 793 PG/ML (ref 0–1800)
NUGENT SCORE: 6
PH UR STRIP: 5.5 [PH] (ref 5–7)
PLATELET # BLD AUTO: 185 10E3/UL (ref 150–450)
PLATELET # BLD AUTO: 195 10E3/UL (ref 150–450)
PLATELET # BLD AUTO: 207 10E3/UL (ref 150–450)
PLATELET # BLD AUTO: 245 10E3/UL (ref 150–450)
POTASSIUM BLD-SCNC: 3.2 MMOL/L (ref 3.5–5)
POTASSIUM BLD-SCNC: 3.7 MMOL/L (ref 3.5–5)
POTASSIUM SERPL-SCNC: 3.8 MMOL/L (ref 3.4–5.3)
POTASSIUM SERPL-SCNC: 3.9 MMOL/L (ref 3.4–5.3)
POTASSIUM SERPL-SCNC: 4.1 MMOL/L (ref 3.4–5.3)
POTASSIUM SERPL-SCNC: 4.4 MMOL/L (ref 3.4–5.3)
PROCALCITONIN SERPL IA-MCNC: 0.07 NG/ML
PROCALCITONIN SERPL IA-MCNC: 0.2 NG/ML
PROT SERPL-MCNC: 5.8 G/DL (ref 6.4–8.3)
PROT SERPL-MCNC: 6.6 G/DL (ref 6–8)
PROT SERPL-MCNC: 6.8 G/DL (ref 6.4–8.3)
RBC # BLD AUTO: 3.68 10E6/UL (ref 3.8–5.2)
RBC # BLD AUTO: 3.74 10E6/UL (ref 3.8–5.2)
RBC # BLD AUTO: 3.98 10E6/UL (ref 3.8–5.2)
RBC # BLD AUTO: 4.13 10E6/UL (ref 3.8–5.2)
RBC URINE: 1 /HPF
SARS-COV-2 RNA RESP QL NAA+PROBE: NEGATIVE
SODIUM SERPL-SCNC: 135 MMOL/L (ref 136–145)
SODIUM SERPL-SCNC: 140 MMOL/L (ref 136–145)
SODIUM SERPL-SCNC: 141 MMOL/L (ref 136–145)
SODIUM SERPL-SCNC: 142 MMOL/L (ref 136–145)
SODIUM SERPL-SCNC: 143 MMOL/L (ref 136–145)
SP GR UR STRIP: 1.02 (ref 1–1.03)
SQUAMOUS EPITHELIAL: 1 /HPF
TROPONIN T SERPL HS-MCNC: 20 NG/L
TROPONIN T SERPL HS-MCNC: 22 NG/L
TSH SERPL DL<=0.005 MIU/L-ACNC: 2.46 UIU/ML (ref 0.3–4.2)
UROBILINOGEN UR STRIP-MCNC: <2 MG/DL
VIT B12 SERPL-MCNC: 447 PG/ML (ref 232–1245)
WBC # BLD AUTO: 11.4 10E3/UL (ref 4–11)
WBC # BLD AUTO: 11.5 10E3/UL (ref 4–11)
WBC # BLD AUTO: 13.3 10E3/UL (ref 4–11)
WBC # BLD AUTO: 13.4 10E3/UL (ref 4–11)
WBC URINE: <1 /HPF
WHITE BLOOD CELLS: ABNORMAL

## 2022-01-01 PROCEDURE — 85027 COMPLETE CBC AUTOMATED: CPT | Performed by: STUDENT IN AN ORGANIZED HEALTH CARE EDUCATION/TRAINING PROGRAM

## 2022-01-01 PROCEDURE — 120N000001 HC R&B MED SURG/OB

## 2022-01-01 PROCEDURE — 250N000013 HC RX MED GY IP 250 OP 250 PS 637: Performed by: INTERNAL MEDICINE

## 2022-01-01 PROCEDURE — 99207 PR CDG-CUT & PASTE-POTENTIAL IMPACT ON LEVEL: CPT | Performed by: STUDENT IN AN ORGANIZED HEALTH CARE EDUCATION/TRAINING PROGRAM

## 2022-01-01 PROCEDURE — G1010 CDSM STANSON: HCPCS

## 2022-01-01 PROCEDURE — 96372 THER/PROPH/DIAG INJ SC/IM: CPT | Performed by: STUDENT IN AN ORGANIZED HEALTH CARE EDUCATION/TRAINING PROGRAM

## 2022-01-01 PROCEDURE — 36415 COLL VENOUS BLD VENIPUNCTURE: CPT | Performed by: STUDENT IN AN ORGANIZED HEALTH CARE EDUCATION/TRAINING PROGRAM

## 2022-01-01 PROCEDURE — 99239 HOSP IP/OBS DSCHRG MGMT >30: CPT | Performed by: STUDENT IN AN ORGANIZED HEALTH CARE EDUCATION/TRAINING PROGRAM

## 2022-01-01 PROCEDURE — 81001 URINALYSIS AUTO W/SCOPE: CPT | Performed by: STUDENT IN AN ORGANIZED HEALTH CARE EDUCATION/TRAINING PROGRAM

## 2022-01-01 PROCEDURE — 87040 BLOOD CULTURE FOR BACTERIA: CPT | Performed by: INTERNAL MEDICINE

## 2022-01-01 PROCEDURE — 82310 ASSAY OF CALCIUM: CPT | Performed by: INTERNAL MEDICINE

## 2022-01-01 PROCEDURE — 36415 COLL VENOUS BLD VENIPUNCTURE: CPT

## 2022-01-01 PROCEDURE — 84145 PROCALCITONIN (PCT): CPT | Performed by: STUDENT IN AN ORGANIZED HEALTH CARE EDUCATION/TRAINING PROGRAM

## 2022-01-01 PROCEDURE — 93306 TTE W/DOPPLER COMPLETE: CPT | Mod: 26 | Performed by: GENERAL ACUTE CARE HOSPITAL

## 2022-01-01 PROCEDURE — 80053 COMPREHEN METABOLIC PANEL: CPT | Performed by: INTERNAL MEDICINE

## 2022-01-01 PROCEDURE — 85027 COMPLETE CBC AUTOMATED: CPT | Performed by: INTERNAL MEDICINE

## 2022-01-01 PROCEDURE — 83735 ASSAY OF MAGNESIUM: CPT

## 2022-01-01 PROCEDURE — 71045 X-RAY EXAM CHEST 1 VIEW: CPT

## 2022-01-01 PROCEDURE — 99214 OFFICE O/P EST MOD 30 MIN: CPT | Performed by: INTERNAL MEDICINE

## 2022-01-01 PROCEDURE — 87086 URINE CULTURE/COLONY COUNT: CPT | Mod: ORL | Performed by: FAMILY MEDICINE

## 2022-01-01 PROCEDURE — 96360 HYDRATION IV INFUSION INIT: CPT

## 2022-01-01 PROCEDURE — 99221 1ST HOSP IP/OBS SF/LOW 40: CPT | Performed by: PHYSICIAN ASSISTANT

## 2022-01-01 PROCEDURE — 250N000011 HC RX IP 250 OP 636: Performed by: STUDENT IN AN ORGANIZED HEALTH CARE EDUCATION/TRAINING PROGRAM

## 2022-01-01 PROCEDURE — 99232 SBSQ HOSP IP/OBS MODERATE 35: CPT | Performed by: STUDENT IN AN ORGANIZED HEALTH CARE EDUCATION/TRAINING PROGRAM

## 2022-01-01 PROCEDURE — 99233 SBSQ HOSP IP/OBS HIGH 50: CPT | Performed by: CLINICAL NURSE SPECIALIST

## 2022-01-01 PROCEDURE — 84443 ASSAY THYROID STIM HORMONE: CPT | Performed by: INTERNAL MEDICINE

## 2022-01-01 PROCEDURE — 80177 DRUG SCRN QUAN LEVETIRACETAM: CPT | Performed by: STUDENT IN AN ORGANIZED HEALTH CARE EDUCATION/TRAINING PROGRAM

## 2022-01-01 PROCEDURE — 82140 ASSAY OF AMMONIA: CPT | Performed by: INTERNAL MEDICINE

## 2022-01-01 PROCEDURE — 99285 EMERGENCY DEPT VISIT HI MDM: CPT | Mod: 25

## 2022-01-01 PROCEDURE — 99233 SBSQ HOSP IP/OBS HIGH 50: CPT | Performed by: INTERNAL MEDICINE

## 2022-01-01 PROCEDURE — 83735 ASSAY OF MAGNESIUM: CPT | Performed by: INTERNAL MEDICINE

## 2022-01-01 PROCEDURE — 250N000011 HC RX IP 250 OP 636: Performed by: INTERNAL MEDICINE

## 2022-01-01 PROCEDURE — 36415 COLL VENOUS BLD VENIPUNCTURE: CPT | Performed by: INTERNAL MEDICINE

## 2022-01-01 PROCEDURE — 255N000002 HC RX 255 OP 636: Performed by: INTERNAL MEDICINE

## 2022-01-01 PROCEDURE — 84132 ASSAY OF SERUM POTASSIUM: CPT

## 2022-01-01 PROCEDURE — 83880 ASSAY OF NATRIURETIC PEPTIDE: CPT | Performed by: STUDENT IN AN ORGANIZED HEALTH CARE EDUCATION/TRAINING PROGRAM

## 2022-01-01 PROCEDURE — 82607 VITAMIN B-12: CPT | Performed by: INTERNAL MEDICINE

## 2022-01-01 PROCEDURE — U0003 INFECTIOUS AGENT DETECTION BY NUCLEIC ACID (DNA OR RNA); SEVERE ACUTE RESPIRATORY SYNDROME CORONAVIRUS 2 (SARS-COV-2) (CORONAVIRUS DISEASE [COVID-19]), AMPLIFIED PROBE TECHNIQUE, MAKING USE OF HIGH THROUGHPUT TECHNOLOGIES AS DESCRIBED BY CMS-2020-01-R: HCPCS | Performed by: STUDENT IN AN ORGANIZED HEALTH CARE EDUCATION/TRAINING PROGRAM

## 2022-01-01 PROCEDURE — 250N000013 HC RX MED GY IP 250 OP 250 PS 637: Performed by: NURSE PRACTITIONER

## 2022-01-01 PROCEDURE — 99221 1ST HOSP IP/OBS SF/LOW 40: CPT | Performed by: NURSE PRACTITIONER

## 2022-01-01 PROCEDURE — 84145 PROCALCITONIN (PCT): CPT | Performed by: INTERNAL MEDICINE

## 2022-01-01 PROCEDURE — 93005 ELECTROCARDIOGRAM TRACING: CPT | Performed by: STUDENT IN AN ORGANIZED HEALTH CARE EDUCATION/TRAINING PROGRAM

## 2022-01-01 PROCEDURE — 250N000013 HC RX MED GY IP 250 OP 250 PS 637: Performed by: STUDENT IN AN ORGANIZED HEALTH CARE EDUCATION/TRAINING PROGRAM

## 2022-01-01 PROCEDURE — 80048 BASIC METABOLIC PNL TOTAL CA: CPT | Performed by: STUDENT IN AN ORGANIZED HEALTH CARE EDUCATION/TRAINING PROGRAM

## 2022-01-01 PROCEDURE — C9803 HOPD COVID-19 SPEC COLLECT: HCPCS

## 2022-01-01 PROCEDURE — 99223 1ST HOSP IP/OBS HIGH 75: CPT | Performed by: INTERNAL MEDICINE

## 2022-01-01 PROCEDURE — 87106 FUNGI IDENTIFICATION YEAST: CPT | Mod: ORL | Performed by: FAMILY MEDICINE

## 2022-01-01 PROCEDURE — 96361 HYDRATE IV INFUSION ADD-ON: CPT

## 2022-01-01 PROCEDURE — 85025 COMPLETE CBC W/AUTO DIFF WBC: CPT | Performed by: STUDENT IN AN ORGANIZED HEALTH CARE EDUCATION/TRAINING PROGRAM

## 2022-01-01 PROCEDURE — 250N000013 HC RX MED GY IP 250 OP 250 PS 637: Performed by: CLINICAL NURSE SPECIALIST

## 2022-01-01 PROCEDURE — 258N000003 HC RX IP 258 OP 636: Performed by: INTERNAL MEDICINE

## 2022-01-01 PROCEDURE — 82746 ASSAY OF FOLIC ACID SERUM: CPT | Performed by: INTERNAL MEDICINE

## 2022-01-01 PROCEDURE — 80053 COMPREHEN METABOLIC PANEL: CPT | Performed by: STUDENT IN AN ORGANIZED HEALTH CARE EDUCATION/TRAINING PROGRAM

## 2022-01-01 PROCEDURE — 87205 SMEAR GRAM STAIN: CPT | Mod: ORL | Performed by: FAMILY MEDICINE

## 2022-01-01 PROCEDURE — 99214 OFFICE O/P EST MOD 30 MIN: CPT | Performed by: PSYCHIATRY & NEUROLOGY

## 2022-01-01 PROCEDURE — 85610 PROTHROMBIN TIME: CPT | Performed by: STUDENT IN AN ORGANIZED HEALTH CARE EDUCATION/TRAINING PROGRAM

## 2022-01-01 PROCEDURE — 84484 ASSAY OF TROPONIN QUANT: CPT | Performed by: STUDENT IN AN ORGANIZED HEALTH CARE EDUCATION/TRAINING PROGRAM

## 2022-01-01 PROCEDURE — 82248 BILIRUBIN DIRECT: CPT | Performed by: INTERNAL MEDICINE

## 2022-01-01 RX ORDER — DONEPEZIL HYDROCHLORIDE 10 MG/1
10 TABLET, FILM COATED ORAL DAILY
Qty: 30 TABLET | Refills: 11 | Status: SHIPPED | OUTPATIENT
Start: 2022-01-01

## 2022-01-01 RX ORDER — ACETAMINOPHEN 650 MG/1
650 SUPPOSITORY RECTAL EVERY 6 HOURS PRN
Status: DISCONTINUED | OUTPATIENT
Start: 2022-01-01 | End: 2022-01-01 | Stop reason: HOSPADM

## 2022-01-01 RX ORDER — AMOXICILLIN 250 MG
1 CAPSULE ORAL 2 TIMES DAILY PRN
Status: DISCONTINUED | OUTPATIENT
Start: 2022-01-01 | End: 2022-01-01 | Stop reason: HOSPADM

## 2022-01-01 RX ORDER — METOPROLOL TARTRATE 25 MG/1
25 TABLET, FILM COATED ORAL DAILY
Qty: 90 TABLET | Refills: 4 | Status: SHIPPED | OUTPATIENT
Start: 2022-01-01

## 2022-01-01 RX ORDER — SODIUM CHLORIDE 9 MG/ML
INJECTION, SOLUTION INTRAVENOUS CONTINUOUS
Status: DISCONTINUED | OUTPATIENT
Start: 2022-01-01 | End: 2022-01-01

## 2022-01-01 RX ORDER — HYDRALAZINE HYDROCHLORIDE 20 MG/ML
5 INJECTION INTRAMUSCULAR; INTRAVENOUS EVERY 4 HOURS PRN
Status: DISCONTINUED | OUTPATIENT
Start: 2022-01-01 | End: 2022-01-01 | Stop reason: HOSPADM

## 2022-01-01 RX ORDER — POTASSIUM CHLORIDE 750 MG/1
20 TABLET, EXTENDED RELEASE ORAL DAILY
Qty: 180 TABLET | Refills: 1 | Status: SHIPPED | OUTPATIENT
Start: 2022-01-01

## 2022-01-01 RX ORDER — HALOPERIDOL 5 MG/ML
2 INJECTION INTRAMUSCULAR ONCE
Status: COMPLETED | OUTPATIENT
Start: 2022-01-01 | End: 2022-01-01

## 2022-01-01 RX ORDER — CALCIUM CARBONATE 500 MG/1
1000 TABLET, CHEWABLE ORAL 4 TIMES DAILY PRN
Status: DISCONTINUED | OUTPATIENT
Start: 2022-01-01 | End: 2022-01-01 | Stop reason: HOSPADM

## 2022-01-01 RX ORDER — POTASSIUM CHLORIDE 1500 MG/1
20 TABLET, EXTENDED RELEASE ORAL DAILY
Status: DISCONTINUED | OUTPATIENT
Start: 2022-01-01 | End: 2022-01-01 | Stop reason: HOSPADM

## 2022-01-01 RX ORDER — LANOLIN ALCOHOL/MO/W.PET/CERES
3 CREAM (GRAM) TOPICAL
Status: DISCONTINUED | OUTPATIENT
Start: 2022-01-01 | End: 2022-01-01 | Stop reason: HOSPADM

## 2022-01-01 RX ORDER — BISACODYL 10 MG
10 SUPPOSITORY, RECTAL RECTAL DAILY PRN
Status: DISCONTINUED | OUTPATIENT
Start: 2022-01-01 | End: 2022-01-01 | Stop reason: HOSPADM

## 2022-01-01 RX ORDER — METOPROLOL TARTRATE 25 MG/1
25 TABLET, FILM COATED ORAL DAILY
Status: DISCONTINUED | OUTPATIENT
Start: 2022-01-01 | End: 2022-01-01 | Stop reason: HOSPADM

## 2022-01-01 RX ORDER — FUROSEMIDE 20 MG
40 TABLET ORAL DAILY
Status: DISCONTINUED | OUTPATIENT
Start: 2022-01-01 | End: 2022-01-01

## 2022-01-01 RX ORDER — IOPAMIDOL 755 MG/ML
100 INJECTION, SOLUTION INTRAVASCULAR ONCE
Status: COMPLETED | OUTPATIENT
Start: 2022-01-01 | End: 2022-01-01

## 2022-01-01 RX ORDER — FUROSEMIDE 20 MG
20 TABLET ORAL DAILY
Status: DISCONTINUED | OUTPATIENT
Start: 2022-01-01 | End: 2022-01-01 | Stop reason: HOSPADM

## 2022-01-01 RX ORDER — FUROSEMIDE 40 MG
40 TABLET ORAL DAILY
Qty: 90 TABLET | Refills: 4 | Status: ON HOLD | OUTPATIENT
Start: 2022-01-01 | End: 2022-01-01

## 2022-01-01 RX ORDER — DONEPEZIL HYDROCHLORIDE 5 MG/1
10 TABLET, FILM COATED ORAL AT BEDTIME
Status: DISCONTINUED | OUTPATIENT
Start: 2022-01-01 | End: 2022-01-01 | Stop reason: HOSPADM

## 2022-01-01 RX ORDER — ROSUVASTATIN CALCIUM 40 MG/1
40 TABLET, COATED ORAL AT BEDTIME
Status: DISCONTINUED | OUTPATIENT
Start: 2022-01-01 | End: 2022-01-01 | Stop reason: HOSPADM

## 2022-01-01 RX ORDER — AMOXICILLIN 250 MG
2 CAPSULE ORAL 2 TIMES DAILY PRN
Status: DISCONTINUED | OUTPATIENT
Start: 2022-01-01 | End: 2022-01-01 | Stop reason: HOSPADM

## 2022-01-01 RX ORDER — LEVETIRACETAM 100 MG/ML
500 SOLUTION ORAL 2 TIMES DAILY
Qty: 300 ML | Refills: 11 | Status: SHIPPED | OUTPATIENT
Start: 2022-01-01

## 2022-01-01 RX ORDER — ISOSORBIDE MONONITRATE 30 MG/1
30 TABLET, EXTENDED RELEASE ORAL DAILY
Qty: 90 TABLET | Refills: 4 | Status: SHIPPED | OUTPATIENT
Start: 2022-01-01 | End: 2022-01-01

## 2022-01-01 RX ORDER — DONEPEZIL HYDROCHLORIDE 10 MG/1
10 TABLET, FILM COATED ORAL DAILY
Qty: 30 TABLET | Refills: 1 | Status: SHIPPED | OUTPATIENT
Start: 2022-01-01 | End: 2022-01-01

## 2022-01-01 RX ORDER — QUETIAPINE FUMARATE 25 MG/1
25 TABLET, FILM COATED ORAL AT BEDTIME
Status: DISCONTINUED | OUTPATIENT
Start: 2022-01-01 | End: 2022-01-01

## 2022-01-01 RX ORDER — LEVETIRACETAM 100 MG/ML
500 SOLUTION ORAL 2 TIMES DAILY
Status: DISCONTINUED | OUTPATIENT
Start: 2022-01-01 | End: 2022-01-01 | Stop reason: HOSPADM

## 2022-01-01 RX ORDER — ACETAMINOPHEN 325 MG/1
650 TABLET ORAL EVERY 6 HOURS PRN
Status: DISCONTINUED | OUTPATIENT
Start: 2022-01-01 | End: 2022-01-01 | Stop reason: HOSPADM

## 2022-01-01 RX ORDER — HALOPERIDOL 5 MG/ML
2 INJECTION INTRAMUSCULAR EVERY 6 HOURS PRN
Status: DISCONTINUED | OUTPATIENT
Start: 2022-01-01 | End: 2022-01-01 | Stop reason: HOSPADM

## 2022-01-01 RX ORDER — ISOSORBIDE MONONITRATE 30 MG/1
15 TABLET, EXTENDED RELEASE ORAL 2 TIMES DAILY
Status: ON HOLD | COMMUNITY
End: 2022-01-01

## 2022-01-01 RX ORDER — FUROSEMIDE 40 MG
20 TABLET ORAL DAILY
Qty: 90 TABLET | Refills: 4 | DISCHARGE
Start: 2022-01-01

## 2022-01-01 RX ORDER — ROSUVASTATIN CALCIUM 40 MG/1
40 TABLET, COATED ORAL DAILY
Qty: 90 TABLET | Refills: 4 | Status: SHIPPED | OUTPATIENT
Start: 2022-01-01

## 2022-01-01 RX ORDER — ACETAMINOPHEN 325 MG/1
975 TABLET ORAL EVERY 4 HOURS PRN
Status: DISCONTINUED | OUTPATIENT
Start: 2022-01-01 | End: 2022-01-01

## 2022-01-01 RX ORDER — ONDANSETRON 4 MG/1
4 TABLET, ORALLY DISINTEGRATING ORAL EVERY 6 HOURS PRN
Status: DISCONTINUED | OUTPATIENT
Start: 2022-01-01 | End: 2022-01-01 | Stop reason: HOSPADM

## 2022-01-01 RX ORDER — ACETAMINOPHEN 325 MG/1
975 TABLET ORAL 3 TIMES DAILY
Status: DISCONTINUED | OUTPATIENT
Start: 2022-01-01 | End: 2022-01-01 | Stop reason: HOSPADM

## 2022-01-01 RX ORDER — HYDRALAZINE HYDROCHLORIDE 20 MG/ML
10 INJECTION INTRAMUSCULAR; INTRAVENOUS EVERY 4 HOURS PRN
Status: DISCONTINUED | OUTPATIENT
Start: 2022-01-01 | End: 2022-01-01

## 2022-01-01 RX ORDER — ONDANSETRON 2 MG/ML
4 INJECTION INTRAMUSCULAR; INTRAVENOUS EVERY 6 HOURS PRN
Status: DISCONTINUED | OUTPATIENT
Start: 2022-01-01 | End: 2022-01-01 | Stop reason: HOSPADM

## 2022-01-01 RX ADMIN — FUROSEMIDE 20 MG: 20 TABLET ORAL at 11:07

## 2022-01-01 RX ADMIN — PERFLUTREN 3 ML: 6.52 INJECTION, SUSPENSION INTRAVENOUS at 15:13

## 2022-01-01 RX ADMIN — ACETAMINOPHEN 975 MG: 325 TABLET, FILM COATED ORAL at 08:32

## 2022-01-01 RX ADMIN — DONEPEZIL HYDROCHLORIDE 10 MG: 5 TABLET, FILM COATED ORAL at 21:58

## 2022-01-01 RX ADMIN — HALOPERIDOL LACTATE 2 MG: 5 INJECTION, SOLUTION INTRAMUSCULAR at 06:33

## 2022-01-01 RX ADMIN — POTASSIUM CHLORIDE 20 MEQ: 1500 TABLET, EXTENDED RELEASE ORAL at 11:07

## 2022-01-01 RX ADMIN — ACETAMINOPHEN 975 MG: 325 TABLET, FILM COATED ORAL at 16:14

## 2022-01-01 RX ADMIN — ACETAMINOPHEN 975 MG: 325 TABLET, FILM COATED ORAL at 20:30

## 2022-01-01 RX ADMIN — LEVETIRACETAM 500 MG: 100 SOLUTION ORAL at 20:30

## 2022-01-01 RX ADMIN — ACETAMINOPHEN 975 MG: 325 TABLET, FILM COATED ORAL at 16:10

## 2022-01-01 RX ADMIN — METOPROLOL TARTRATE 25 MG: 25 TABLET, FILM COATED ORAL at 08:29

## 2022-01-01 RX ADMIN — METOPROLOL TARTRATE 25 MG: 25 TABLET, FILM COATED ORAL at 18:03

## 2022-01-01 RX ADMIN — RIVAROXABAN 15 MG: 15 TABLET, FILM COATED ORAL at 17:23

## 2022-01-01 RX ADMIN — DONEPEZIL HYDROCHLORIDE 10 MG: 5 TABLET, FILM COATED ORAL at 21:43

## 2022-01-01 RX ADMIN — ACETAMINOPHEN 975 MG: 325 TABLET, FILM COATED ORAL at 12:50

## 2022-01-01 RX ADMIN — ACETAMINOPHEN 975 MG: 325 TABLET, FILM COATED ORAL at 11:07

## 2022-01-01 RX ADMIN — FUROSEMIDE 20 MG: 20 TABLET ORAL at 08:53

## 2022-01-01 RX ADMIN — QUETIAPINE FUMARATE 12.5 MG: 25 TABLET ORAL at 21:23

## 2022-01-01 RX ADMIN — Medication 5 MG: at 21:23

## 2022-01-01 RX ADMIN — QUETIAPINE 12.5 MG: 25 TABLET, FILM COATED ORAL at 16:15

## 2022-01-01 RX ADMIN — LEVETIRACETAM 500 MG: 100 SOLUTION ORAL at 10:22

## 2022-01-01 RX ADMIN — RIVAROXABAN 15 MG: 15 TABLET, FILM COATED ORAL at 16:11

## 2022-01-01 RX ADMIN — DONEPEZIL HYDROCHLORIDE 10 MG: 5 TABLET, FILM COATED ORAL at 01:01

## 2022-01-01 RX ADMIN — LEVETIRACETAM 500 MG: 100 SOLUTION ORAL at 08:54

## 2022-01-01 RX ADMIN — QUETIAPINE 12.5 MG: 25 TABLET, FILM COATED ORAL at 20:11

## 2022-01-01 RX ADMIN — HALOPERIDOL LACTATE 2 MG: 5 INJECTION, SOLUTION INTRAMUSCULAR at 09:32

## 2022-01-01 RX ADMIN — SODIUM CHLORIDE 50 ML/HR: 9 INJECTION, SOLUTION INTRAVENOUS at 12:11

## 2022-01-01 RX ADMIN — ROSUVASTATIN CALCIUM 40 MG: 40 TABLET, FILM COATED ORAL at 01:01

## 2022-01-01 RX ADMIN — DONEPEZIL HYDROCHLORIDE 10 MG: 5 TABLET, FILM COATED ORAL at 21:24

## 2022-01-01 RX ADMIN — BISACODYL 10 MG: 10 SUPPOSITORY RECTAL at 08:37

## 2022-01-01 RX ADMIN — ACETAMINOPHEN 975 MG: 325 TABLET, FILM COATED ORAL at 20:12

## 2022-01-01 RX ADMIN — ACETAMINOPHEN 975 MG: 325 TABLET, FILM COATED ORAL at 08:53

## 2022-01-01 RX ADMIN — QUETIAPINE 12.5 MG: 25 TABLET, FILM COATED ORAL at 08:25

## 2022-01-01 RX ADMIN — METOPROLOL TARTRATE 25 MG: 25 TABLET, FILM COATED ORAL at 08:53

## 2022-01-01 RX ADMIN — POTASSIUM CHLORIDE 20 MEQ: 1500 TABLET, EXTENDED RELEASE ORAL at 18:03

## 2022-01-01 RX ADMIN — RIVAROXABAN 15 MG: 15 TABLET, FILM COATED ORAL at 18:03

## 2022-01-01 RX ADMIN — ACETAMINOPHEN 975 MG: 325 TABLET, FILM COATED ORAL at 20:28

## 2022-01-01 RX ADMIN — QUETIAPINE 12.5 MG: 25 TABLET, FILM COATED ORAL at 17:21

## 2022-01-01 RX ADMIN — POTASSIUM CHLORIDE 20 MEQ: 1500 TABLET, EXTENDED RELEASE ORAL at 08:43

## 2022-01-01 RX ADMIN — LEVETIRACETAM 500 MG: 100 SOLUTION ORAL at 11:07

## 2022-01-01 RX ADMIN — HALOPERIDOL LACTATE 2 MG: 5 INJECTION, SOLUTION INTRAMUSCULAR at 10:27

## 2022-01-01 RX ADMIN — ROSUVASTATIN CALCIUM 40 MG: 40 TABLET, FILM COATED ORAL at 21:58

## 2022-01-01 RX ADMIN — SODIUM CHLORIDE 50 ML/HR: 9 INJECTION, SOLUTION INTRAVENOUS at 07:41

## 2022-01-01 RX ADMIN — ISOSORBIDE MONONITRATE 15 MG: 30 TABLET, EXTENDED RELEASE ORAL at 08:43

## 2022-01-01 RX ADMIN — RIVAROXABAN 15 MG: 15 TABLET, FILM COATED ORAL at 17:21

## 2022-01-01 RX ADMIN — ISOSORBIDE MONONITRATE 15 MG: 30 TABLET, EXTENDED RELEASE ORAL at 19:49

## 2022-01-01 RX ADMIN — QUETIAPINE 12.5 MG: 25 TABLET, FILM COATED ORAL at 12:32

## 2022-01-01 RX ADMIN — ROSUVASTATIN CALCIUM 40 MG: 40 TABLET, FILM COATED ORAL at 21:23

## 2022-01-01 RX ADMIN — METOPROLOL TARTRATE 25 MG: 25 TABLET, FILM COATED ORAL at 11:08

## 2022-01-01 RX ADMIN — FUROSEMIDE 40 MG: 20 TABLET ORAL at 18:02

## 2022-01-01 RX ADMIN — POTASSIUM CHLORIDE 20 MEQ: 1500 TABLET, EXTENDED RELEASE ORAL at 08:35

## 2022-01-01 RX ADMIN — POTASSIUM CHLORIDE 20 MEQ: 1500 TABLET, EXTENDED RELEASE ORAL at 08:54

## 2022-01-01 RX ADMIN — ROSUVASTATIN CALCIUM 40 MG: 40 TABLET, FILM COATED ORAL at 21:44

## 2022-01-01 RX ADMIN — IOPAMIDOL 100 ML: 755 INJECTION, SOLUTION INTRAVENOUS at 16:45

## 2022-01-01 RX ADMIN — METOPROLOL TARTRATE 25 MG: 25 TABLET, FILM COATED ORAL at 08:43

## 2022-01-01 RX ADMIN — LEVETIRACETAM 500 MG: 100 SOLUTION ORAL at 20:34

## 2022-01-01 RX ADMIN — LEVETIRACETAM 500 MG: 100 SOLUTION ORAL at 08:44

## 2022-01-01 RX ADMIN — FUROSEMIDE 40 MG: 20 TABLET ORAL at 08:43

## 2022-01-01 RX ADMIN — LEVETIRACETAM 500 MG: 100 SOLUTION ORAL at 21:39

## 2022-01-01 RX ADMIN — LEVETIRACETAM 500 MG: 100 SOLUTION ORAL at 19:49

## 2022-01-01 ASSESSMENT — ACTIVITIES OF DAILY LIVING (ADL)
ADLS_ACUITY_SCORE: 35
ADLS_ACUITY_SCORE: 49
ADLS_ACUITY_SCORE: 39
ADLS_ACUITY_SCORE: 47
ADLS_ACUITY_SCORE: 47
ADLS_ACUITY_SCORE: 51
ADLS_ACUITY_SCORE: 35
ADLS_ACUITY_SCORE: 35
ADLS_ACUITY_SCORE: 51
ADLS_ACUITY_SCORE: 35
ADLS_ACUITY_SCORE: 35
ADLS_ACUITY_SCORE: 55
ADLS_ACUITY_SCORE: 49
ADLS_ACUITY_SCORE: 39
ADLS_ACUITY_SCORE: 51
ADLS_ACUITY_SCORE: 45
ADLS_ACUITY_SCORE: 51
ADLS_ACUITY_SCORE: 35
ADLS_ACUITY_SCORE: 39
ADLS_ACUITY_SCORE: 51
ADLS_ACUITY_SCORE: 47
ADLS_ACUITY_SCORE: 51
ADLS_ACUITY_SCORE: 55
ADLS_ACUITY_SCORE: 45
ADLS_ACUITY_SCORE: 51
ADLS_ACUITY_SCORE: 43
ADLS_ACUITY_SCORE: 51
ADLS_ACUITY_SCORE: 35
ADLS_ACUITY_SCORE: 49
ADLS_ACUITY_SCORE: 47
ADLS_ACUITY_SCORE: 51
ADLS_ACUITY_SCORE: 49
ADLS_ACUITY_SCORE: 39
ADLS_ACUITY_SCORE: 51
ADLS_ACUITY_SCORE: 35
ADLS_ACUITY_SCORE: 35
ADLS_ACUITY_SCORE: 51
ADLS_ACUITY_SCORE: 35
ADLS_ACUITY_SCORE: 49
ADLS_ACUITY_SCORE: 55
ADLS_ACUITY_SCORE: 51
ADLS_ACUITY_SCORE: 49
ADLS_ACUITY_SCORE: 45
ADLS_ACUITY_SCORE: 49
DEPENDENT_IADLS:: CLEANING;COOKING;LAUNDRY;SHOPPING;MEAL PREPARATION;MEDICATION MANAGEMENT;MONEY MANAGEMENT;TRANSPORTATION;INCONTINENCE
ADLS_ACUITY_SCORE: 51
ADLS_ACUITY_SCORE: 51
ADLS_ACUITY_SCORE: 55

## 2022-01-01 ASSESSMENT — ENCOUNTER SYMPTOMS: CONFUSION: 1

## 2022-02-02 NOTE — TELEPHONE ENCOUNTER
Refill request for Aricept. Pt last seen 1/13/21 and has follow up scheduled for 3/15/22. Will send in refill to get pt through to this appt.     Kaylee Simmons RN on 2/2/2022 at 8:15 AM

## 2022-03-15 NOTE — NURSING NOTE
Chief Complaint   Patient presents with     Dementia     Kaylee Simmons RN on 3/15/2022 at 9:04 AM

## 2022-03-15 NOTE — PROGRESS NOTES
NEUROLOGY FOLLOW UP VISIT  NOTE       [facility] [place]  [address] [place] [address]  Tel: [phone]  Fax: [phone]  [email or web address]     Beverly Sutton,  1932, MRN [id number]  PCP: Leslie Loaiza  Date: 03/15/2022      ASSESSMENT & PLAN     Visit Diagnosis  1. Late onset Alzheimer's disease without behavioral disturbance (H)  2. 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  There has been no significant change in her neurocognitive disorder.  She is  care that is provided by his 2 sons.  She is currently on Aricept and I refilled the prescription and I am checking hepatic profile    Complex partial seizures  Patient has history of complex partial seizures and previously EEG showed left temporal sharp activity.  During her last visit her Keppra level was somewhat elevated and dose was decreased to 500 mg twice daily.  She has remained seizure-free, I have refilled the prescription and I am checking electrolyte panel and Keppra level.  Regular follow-up will be in 1 year    Thank you again for this referral, please feel free to contact me if you have any questions.    Cayden Estrada MD  [facility]  (Former[facility], [facility]     HISTORY OF PRESENT ILLNESS     Patient is 89-year-old female with history of late onset Alzheimer's dementia without behavioral disturbances, complex partial seizure, CAD s/p CABG, right optic glioma, neurofibromatosis type I pulmonary embolism (on Xarelto) and congenital vascular  malformation of the left orbit who returns for yearly follow-up for her complex partial seizures and Alzheimer's dementia.  She appears to be at her baseline and son reports no change in her dementia.  Patient has steadily declined and although son is extremely dedicated she does not recognize him.  She also has history of seizures but son denies any seizures since her last visit.  She has minimal verbal output. During her last visit she was noted to have fairly high Keppra level and Keppra dose was decreased to 500 mg twice daily and a repeat level was checked that was therapeutic.       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     Coronary atherosclerosis I25.10     Dyslipidemia E78.5     GERD (gastroesophageal reflux disease) K21.9     Pulmonary embolism and infarction (H) I26.99         PAST MEDICAL & SURGICAL HISTORY     Past Medical History:   Patient  has a past medical history of Arthritis of knee, Breast cancer (H), Cancer (H), Cataract, Cataracts, bilateral, Cognitive decline, Complex partial seizure disorder (H) (7/15/2016), Coronary artery disease, 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    Surgical History:  She  has a past surgical history that includes strabismus surgery; h/o heart surgery (2006); Mastectomy (Left, 2013); Mastectomy (Right, 1995); rotator cuff repair rt/lt (Right, 2000); rotator cuff repair rt/lt (Left, 2003); CABG, VEIN, SINGLE; Cardiac catheterization; Mastectomy;  Cholecystectomy; and Arthroscopy shoulder rotator cuff repair (Bilateral).     SOCIAL HISTORY     Reviewed, and she  reports that she has never smoked. She has never used smokeless tobacco. She reports that she does not drink alcohol and does not use drugs.     FAMILY HISTORY     Reviewed, and family history includes Alzheimer Disease in her mother; Cancer in her father, mother, and sister; Coronary Artery Disease in her mother; Glaucoma in her mother.     ALLERGIES     Allergies   Allergen Reactions     Clarithromycin Swelling     Neck Swelling           REVIEW OF SYSTEMS     A 12 point review of system was performed and was negative except as outlined in the history of present illness.     HOME MEDICATIONS     Current Outpatient Rx   Medication Sig Dispense Refill     donepezil (ARICEPT) 10 MG tablet Take 1 tablet (10 mg) by mouth daily 30 tablet 11     furosemide (LASIX) 40 MG tablet Take 1 tablet (40 mg) by mouth daily 90 tablet 1     isosorbide mononitrate (IMDUR) 30 MG 24 hr tablet Take 0.5 tablets (15 mg total) by mouth 2 (two) times a day.       levETIRAcetam (KEPPRA) 100 MG/ML solution Take 5 mLs (500 mg) by mouth 2 times daily 300 mL 11     melatonin 5 MG CAPS        metoprolol tartrate (LOPRESSOR) 25 MG tablet Take 1 tablet (25 mg total) by mouth 2 (two) times a day.       multivitamin, therapeutic with minerals (THERA-VIT-M) TABS tablet Take 1 tablet by mouth       potassium chloride ER (KLOR-CON M) 10 MEQ CR tablet Take 1 tablet (10 mEq) by mouth daily 90 tablet 1     rivaroxaban ANTICOAGULANT (XARELTO) 15 MG TABS tablet Take by mouth daily (with dinner)       rosuvastatin (CRESTOR) 40 MG tablet Take 1 tablet (40 mg) by mouth daily 90 tablet 1         PHYSICAL EXAM     Vital signs  /68 (BP Location: Left arm, Patient Position: Sitting)   Pulse 67   Wt 54.4 kg (120 lb)   BMI 21.95 kg/m      Weight:   120 lbs 0 oz    Patient is alert and oriented x1 sitting in a chair minimally responsive.She  moves all 4 extremities reflexes are 3+ she is wheelchair-bound     PERTINENT DIAGNOSTIC STUDIES     Following studies were reviewed:     (01/04/2018 11:24 CST BRAIN PET Order)  Pattern of brain hypometabolism compatible with Alzheimer's dementia    MRI HEAD & ORBITS 7/14/2016  HEAD MRI:  1.  No acute infarct or intracranial hemorrhage.  2.  Sequela of remote ischemic insult to the right cerebellar hemisphere.  3.  Mild to moderate age-related changes.     ORBIT MRI:  1.  Lobular enhancing structures within the left orbit correlate to findings on unenhanced head CT examination dated 07/13/2016. Noted lesion extension into the left orbital apex demonstrates contiguity with multispacial enhancing soft tissue mass   expanding the left pterygopalatine fossa, left sphenopalatine foramen, and retroantral fat pad. Signal characteristics are compatible with a venous vascular malformation and multiple orbital varices.  Findings are likely congenital , with   hypopneumatization of the left sphenoid sinus. Correlation with intermittent, reversible proptosis elicited by Valsalva maneuver or head positioning is recommended. MRA examination of the head could be performed to exclude high flow arteriovenous   component as clinically warranted.  2.  Tortuosity and fusiform expansion of the intraorbital and intracanalicular segments of the right optic nerve, with nonenhancing T2 prolongation. Findings are compatible with known optic pathway glioma.     MRI BRAIN 1/19/2017  1. The exam is considerably degraded by motion.  2. No acute infarcts, intra-axial mass lesions or hemorrhage.  3. Age-related changes as detailed above.  4. The patient's known optic nerve gliomas within the orbits are suboptimally seen due to motion.     EEG 7/14/2016  This is abnormal EEG due to diffusely slow background in theta and delta range that suggests nonspecific generalized cerebral dysfunction.  In addition there is rare left temporal sharp activity  that suggests low threshold for focal seizures.     PERTINENT LABS  Following labs were reviewed:  Lab Requisition on 03/02/2022   Component Date Value     Culture 03/02/2022 >100,000 CFU/mL Mixture of urogenital adolfo    Lab Requisition on 03/01/2022   Component Date Value     Culture 03/01/2022 2+ Candida albicans (A)     Aaron score 03/01/2022 6  (A)     White Blood Cells 03/01/2022 2+ Predominately PMNs      Bacterial Vaginosis Smear 03/01/2022 1+ Yeast (A)         Total time spent for face to face visit, reviewing labs/imaging studies, counseling and coordination of care was: 30 Minutes spent on the date of the encounter doing chart review, review of outside records, review of test results, interpretation of tests, patient visit, documentation and discussion with family       This note was dictated using voice recognition software.  Any grammatical or context distortions are unintentional and inherent to the software.    Orders Placed This Encounter   Procedures     Keppra (Levetiracetam) Level     Comprehensive metabolic panel      New Prescriptions    No medications on file     Modified Medications    Modified Medication Previous Medication    DONEPEZIL (ARICEPT) 10 MG TABLET donepezil (ARICEPT) 10 MG tablet       Take 1 tablet (10 mg) by mouth daily    Take 1 tablet (10 mg) by mouth daily    LEVETIRACETAM (KEPPRA) 100 MG/ML SOLUTION levETIRAcetam (KEPPRA) 100 MG/ML solution       Take 5 mLs (500 mg) by mouth 2 times daily    Take 5 mLs (500 mg) by mouth 2 times daily

## 2022-03-15 NOTE — LETTER
3/15/2022         RE: Beverly Sutton   [address]  [facility] [id number]        Dear Colleague,    Thank you for referring your patient, Beverly Sutton, to the [facility] [place]. Please see a copy of my visit note below.    NEUROLOGY FOLLOW UP VISIT  NOTE       [facility] [place]  [address] [place] [address]  Tel: [phone]  Fax: [phone]  [email or web address]     Beverly Sutton,  1932, MRN [id number]  PCP: Leslie Loaiza  Date: 03/15/2022      ASSESSMENT & PLAN     Visit Diagnosis  1. Late onset Alzheimer's disease without behavioral disturbance (H)  2. 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  There has been no significant change in her neurocognitive disorder.  She is  care that is provided by his 2 sons.  She is currently on Aricept and I refilled the prescription and I am checking hepatic profile    Complex partial seizures  Patient has history of complex partial seizures and previously EEG showed left temporal sharp activity.  During her last visit her Keppra level was somewhat elevated and dose was decreased to 500 mg twice daily.  She has remained seizure-free, I have refilled the prescription and I am checking electrolyte panel and Keppra level.  Regular follow-up will be in 1 year    Thank you again for this referral, please feel free to contact me if you have any questions.    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  She appears to be at her baseline and son reports no change in her dementia.  Patient has steadily declined and although son is extremely dedicated she does not recognize him.  She also has history of seizures but son denies any seizures since her last visit.  She has minimal verbal output. During her last visit she was noted to have fairly high Keppra level and Keppra dose was decreased to 500 mg twice daily and a repeat level was checked that was therapeutic.       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     Coronary atherosclerosis I25.10     Dyslipidemia E78.5     GERD (gastroesophageal reflux disease) K21.9     Pulmonary embolism and infarction (H) I26.99         PAST MEDICAL & SURGICAL HISTORY     Past Medical History:   Patient  has a past medical history of Arthritis of knee, Breast cancer (H), Cancer (H), Cataract, Cataracts, bilateral, Cognitive decline, Complex partial seizure disorder (H) (7/15/2016), Coronary artery disease, 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    Surgical History:  She  has a past surgical history  that includes strabismus surgery; h/o heart surgery (2006); Mastectomy (Left, 2013); Mastectomy (Right, 1995); rotator cuff repair rt/lt (Right, 2000); rotator cuff repair rt/lt (Left, 2003); CABG, VEIN, SINGLE; Cardiac catheterization; Mastectomy; Cholecystectomy; and Arthroscopy shoulder rotator cuff repair (Bilateral).     SOCIAL HISTORY     Reviewed, and she  reports that she has never smoked. She has never used smokeless tobacco. She reports that she does not drink alcohol and does not use drugs.     FAMILY HISTORY     Reviewed, and family history includes Alzheimer Disease in her mother; Cancer in her father, mother, and sister; Coronary Artery Disease in her mother; Glaucoma in her mother.     ALLERGIES     Allergies   Allergen Reactions     Clarithromycin Swelling     Neck Swelling           REVIEW OF SYSTEMS     A 12 point review of system was performed and was negative except as outlined in the history of present illness.     HOME MEDICATIONS     Current Outpatient Rx   Medication Sig Dispense Refill     donepezil (ARICEPT) 10 MG tablet Take 1 tablet (10 mg) by mouth daily 30 tablet 11     furosemide (LASIX) 40 MG tablet Take 1 tablet (40 mg) by mouth daily 90 tablet 1     isosorbide mononitrate (IMDUR) 30 MG 24 hr tablet Take 0.5 tablets (15 mg total) by mouth 2 (two) times a day.       levETIRAcetam (KEPPRA) 100 MG/ML solution Take 5 mLs (500 mg) by mouth 2 times daily 300 mL 11     melatonin 5 MG CAPS        metoprolol tartrate (LOPRESSOR) 25 MG tablet Take 1 tablet (25 mg total) by mouth 2 (two) times a day.       multivitamin, therapeutic with minerals (THERA-VIT-M) TABS tablet Take 1 tablet by mouth       potassium chloride ER (KLOR-CON M) 10 MEQ CR tablet Take 1 tablet (10 mEq) by mouth daily 90 tablet 1     rivaroxaban ANTICOAGULANT (XARELTO) 15 MG TABS tablet Take by mouth daily (with dinner)       rosuvastatin (CRESTOR) 40 MG tablet Take 1 tablet (40 mg) by mouth daily 90 tablet 1          PHYSICAL EXAM     Vital signs  /68 (BP Location: Left arm, Patient Position: Sitting)   Pulse 67   Wt 54.4 kg (120 lb)   BMI 21.95 kg/m      Weight:   120 lbs 0 oz    Patient is alert and oriented x1 sitting in a chair minimally responsive.She moves all 4 extremities reflexes are 3+ she is wheelchair-bound     PERTINENT DIAGNOSTIC STUDIES     Following studies were reviewed:     (01/04/2018 11:24 CST BRAIN PET Order)  Pattern of brain hypometabolism compatible with Alzheimer's dementia    MRI HEAD & ORBITS 7/14/2016  HEAD MRI:  1.  No acute infarct or intracranial hemorrhage.  2.  Sequela of remote ischemic insult to the right cerebellar hemisphere.  3.  Mild to moderate age-related changes.     ORBIT MRI:  1.  Lobular enhancing structures within the left orbit correlate to findings on unenhanced head CT examination dated 07/13/2016. Noted lesion extension into the left orbital apex demonstrates contiguity with multispacial enhancing soft tissue mass   expanding the left pterygopalatine fossa, left sphenopalatine foramen, and retroantral fat pad. Signal characteristics are compatible with a venous vascular malformation and multiple orbital varices.  Findings are likely congenital , with   hypopneumatization of the left sphenoid sinus. Correlation with intermittent, reversible proptosis elicited by Valsalva maneuver or head positioning is recommended. MRA examination of the head could be performed to exclude high flow arteriovenous   component as clinically warranted.  2.  Tortuosity and fusiform expansion of the intraorbital and intracanalicular segments of the right optic nerve, with nonenhancing T2 prolongation. Findings are compatible with known optic pathway glioma.     MRI BRAIN 1/19/2017  1. The exam is considerably degraded by motion.  2. No acute infarcts, intra-axial mass lesions or hemorrhage.  3. Age-related changes as detailed above.  4. The patient's known optic nerve gliomas within the  orbits are suboptimally seen due to motion.     EEG 7/14/2016  This is abnormal EEG due to diffusely slow background in theta and delta range that suggests nonspecific generalized cerebral dysfunction.  In addition there is rare left temporal sharp activity that suggests low threshold for focal seizures.     PERTINENT LABS  Following labs were reviewed:  Lab Requisition on 03/02/2022   Component Date Value     Culture 03/02/2022 >100,000 CFU/mL Mixture of urogenital adolfo    Lab Requisition on 03/01/2022   Component Date Value     Culture 03/01/2022 2+ Candida albicans (A)     Aaron score 03/01/2022 6  (A)     White Blood Cells 03/01/2022 2+ Predominately PMNs      Bacterial Vaginosis Smear 03/01/2022 1+ Yeast (A)         Total time spent for face to face visit, reviewing labs/imaging studies, counseling and coordination of care was: 30 Minutes spent on the date of the encounter doing chart review, review of outside records, review of test results, interpretation of tests, patient visit, documentation and discussion with family       This note was dictated using voice recognition software.  Any grammatical or context distortions are unintentional and inherent to the software.    Orders Placed This Encounter   Procedures     Keppra (Levetiracetam) Level     Comprehensive metabolic panel      New Prescriptions    No medications on file     Modified Medications    Modified Medication Previous Medication    DONEPEZIL (ARICEPT) 10 MG TABLET donepezil (ARICEPT) 10 MG tablet       Take 1 tablet (10 mg) by mouth daily    Take 1 tablet (10 mg) by mouth daily    LEVETIRACETAM (KEPPRA) 100 MG/ML SOLUTION levETIRAcetam (KEPPRA) 100 MG/ML solution       Take 5 mLs (500 mg) by mouth 2 times daily    Take 5 mLs (500 mg) by mouth 2 times daily                     Again, thank you for allowing me to participate in the care of your patient.        Sincerely,        Cayden Estrada MD

## 2022-03-15 NOTE — PATIENT INSTRUCTIONS
Patient Education     Alzheimer Disease  Alzheimer disease is a brain illness that can happen usually in older adults, but it can also happen as early as age 40. It is the most common cause of dementia. It is a progressive disease. This means it gets worse over time.  What is Alzheimer disease?  Alzheimer disease causes a series of changes to nerves of the brain. Some nerves form into clumps and tangles, and lose some of their connections to other nerves.  Healthcare providers don t fully understand what causes Alzheimer disease. But they think these may be some of the causes:    Age and family history    Certain genes    Abnormal protein deposits in the brain    Environmental factors    Problems with a person s immune system    Possibly infections  Symptoms of Alzheimer disease  The disease causes changes in behavior and thinking known as dementia. The symptoms include:    Memory loss    Confusion    Restlessness    Personality and behavior changes    Problems with judgment    Problems communicating with others    Inability to follow directions    Lack of emotion  Diagnosing Alzheimer disease  No single test is able to diagnose Alzheimer disease. Instead healthcare providers use a series of tests to rule out other health conditions. The tests may include:    A complete medical history. This may include questions about overall health and past health problems. The healthcare provider may ask how well the person can do daily tasks. The healthcare provider may ask family or close friends about any changes in behavior or personality.    Mental status test. This is a test of memory, problem solving, attention, counting, and language.     Standard medical tests. These may include blood and urine tests to find possible causes for the problem.    Brain imaging tests.  CT, MRI, or positron emission tomography (PET) may be used to rule out other causes of the problem.  Treating Alzheimer disease  Alzheimer disease has no  cure. Instead healthcare providers can help ease some symptoms. This can make a person with Alzheimer more comfortable. Treatment can also make it easier for their caregivers to take care of them.  Some medicines may help slow the decline of a person s memory, thinking, and language skills. They may help with problems of behavior, such as aggression. They can lessen hallucinations and delusions. These medicines can work for some but not all people. And they may help for only a limited time. Medicines include:    Cholinesterase inhibitors    Donepezil    Galantamine    Rivastigmine    Memantine  In some cases, behavior problems can be caused by medicine side effects. Talk with the person s healthcare provider about all medicines he or she is taking.  Keeping healthy  For a person with Alzheimer, it s important to stay healthy. Good nutrition and physical and social activity are vital. A calm and well-structured environment will help. Make sure to keep up with healthcare appointments and managing other health conditions, such as diabetes. Some people benefit from having a nutritionist help to prevent weight loss.    Caring for someone with Alzheimer  A person with Alzheimer will need more caregiving over time. Talk with your healthcare provider about caregiving resources.   [employer] last reviewed this educational content on 4/1/2018 2000-2021 [employer]. All rights reserved. This information is not intended as a substitute for professional medical care. Always follow your healthcare professional's instructions.

## 2022-03-29 NOTE — PATIENT INSTRUCTIONS
I will be in touch with blood work.Please update with 6 to 8 blood pressure readings over the next 1 month.Please call with any heart related questions.My nurse is Shilpi and her number is [phone]

## 2022-03-29 NOTE — PROGRESS NOTES
HEART CARE ENCOUNTER CONSULTATON NOTE      [facility]  [phone]      Assessment/Recommendations   Assessment/Plan:  1.  Coronary artery disease.  Patient unable to provide history but son who cares for her indicates that she has not had any specific complaints of chest pain or shortness of breath without use of nitroglycerin.  She continues with the current combination of medications.  I elected not to make any changes in her medication regimen and refilled her medications.  She is on rosuvastatin with the recent LDL being mildly above ideal goal.  In addition she is taking metoprolol 25 mg once daily as a tartrate preparation.  We discussed that this is typically a twice daily medication or changing it to metoprolol XL but given that she is tolerating the current dosing schedule I elected not to make any changes.    2.  History of pulmonary embolism.  She has been on Xarelto 15 mg daily.  She follows with her primary care provider in this regard.  Chart records indicates she was admitted January 2017 with acute pulmonary embolism and found to have DVT and chronic anticoagulation was initiated.    3.  Heart failure with preserved ejection fraction.  The most recent echocardiogram is from 2017.  At that time her ejection fraction was reported normal without significant valve abnormality.  We did discuss whether we would consider repeat echocardiogram but her son prefers not to pursue and less specific symptoms were to arise.  We will plan follow-up blood work given that she is on furosemide and potassium.    4.  Hypertension.  Blood pressure elevated upon arrival and was also noted to be 150 systolic at her primary care physician's office.  However on recheck her blood pressure was 110/70.  Her son is able to check her blood pressure and I asked him to update me with some blood pressure readings.    Lab work today with follow-up in 1 year.  Will comment on the lab results.       History of  Present Illness/Subjective    HPI: Beverly Sutton is a 89 year old female is seen in follow up.She has a history of bypass surgery a number of years ago with coronary angiography in 2009 demonstrating patent LIMA to the LAD and patent vein graft to the posterior descending small to moderate size diagonal branch that had stenosis but because of the acute angle medical management was pursued.  Most recent stress test is 2015 that was reported negative for ischemia and most recent echocardiogram was from 2017 with normal ejection fraction and no significant valve abnormality at that time.  She has brought by her son who cares for her.  She has limited ability to participate in the history.  Her son indicates that she is not been complaining of chest pain or shortness of breath.  She has seen her primary care recently for a vaginal infection.  I have reviewed laboratory studies from her primary care physician from November 2021 at which time the sodium was 144, potassium 3.8, creatinine 0.67.  Her cholesterol total was 152 with an LDL mildly above ideal goal at 83 but have elected to leave her on the current dose of rosuvastatin.  She is on Xarelto secondary to a history of pulmonary embolism.    Recent Echocardiogram Results:  External Result Report    External Result Report       Echocardiogram Complete  Order: [id number]   Status: Final result       Visible to patient: No (inaccessible in MyChart)       Next appt: 03/29/2022 at 08:20 AM in Cardiology (Garry Maynard MD)       Dx: CAD (coronary artery disease)       1 Result Note      Details    Reading Physician Reading Date Result Priority   Oleg Shine MD  [phone] 9/15/2017 Routine   Provider, Historical 9/15/2017      Narrative & Impression    Left ventricle ejection fraction is normal. The calculated left ventricular ejection fraction is 58%.    No hemodynamically significant valvular heart abnormalities.    When compared to the previous study dated  1/18/2017, no significant change.          SUMMARY:  1.  Subjectively and objectively negative regadenoson infusion.  2.  Regadenoson nuclear imaging does not suggest any ischemia or prior scar.  3.  Hyperdynamic wall motion as mentioned above with some septal hypokinesis of  uncertain significance.  4.  When compared to prior nuclear stress test report, from 03/12/2013, there is no  significant change.  Septal hypokinesis seen on the current study was not mentioned  on the prior report.        JANE MOLINA MD  [facility]  D 07/08/2014 15:09:29  T 07/08/2014 15:33:09  R 07/08/2014 15               Physical Examination  Review of Systems   Vitals: 156/96 upon arrival during my examination 110/70, heart rate of 80s and regular, weight 120 pounds.  Wt Readings from Last 3 Encounters:   03/15/22 54.4 kg (120 lb)   03/03/21 53.5 kg (118 lb)   01/13/21 53.5 kg (118 lb)       General Appearance:    In a wheelchair not able to participate in answering questions.  She states she just wants to go home., normal body habitus   ENT/Mouth: membranes moist, no oral lesions or bleeding gums.      EYES:  no scleral icterus, normal conjunctivae   Neck: no carotid bruits or thyromegaly   Chest/Lungs:   lungs are clear to auscultation, no rales or wheezing, , equal chest wall expansion    Cardiovascular:   Regular. Normal first and second heart sounds with no significant murmurs, rubs, or gallops; the carotid, radial and posterior tibial pulses are intact, Jugular venous pressure difficult to assess as she is examined in the chair.,  Mild dependent edema of the right lower extremity.   Abdomen:  no organomegaly, masses, bruits, or tenderness; bowel sounds are present   Extremities: no cyanosis or clubbing   Skin: no xanthelasma, warm.  Multiple neurofibromas.   Neurologic: normal  bilateral, no tremors     Psychiatric: alert and oriented x3, calm        Please refer above for cardiac ROS details.        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     Pulmonary embolism (H) 01/18/2017     Tremor     chronic, now w/c bound      Past Surgical History:   Procedure Laterality Date     ARTHROSCOPY SHOULDER ROTATOR CUFF REPAIR Bilateral      CARDIAC CATHETERIZATION       CHOLECYSTECTOMY      per son     h/o heart surgery  2006     MASTECTOMY Left 2013     MASTECTOMY Right 1995     MASTECTOMY       ROTATOR CUFF REPAIR RT/LT Right 2000     ROTATOR CUFF REPAIR RT/LT Left 2003     STRABISMUS SURGERY      age 15     ZZC CABG, VEIN, SINGLE      Description: CABG (CABG);  Proc Date: 10/30/2007;  Comments: LIMA to Distal LAD; ; SVG to PDA; ; Both grafts patent on Angiogram 9/09     Family History   Problem Relation Age of Onset     Glaucoma Mother      Coronary Artery Disease Mother      Alzheimer Disease Mother      Cancer Father         pancreas     Cancer Sister      Cancer Mother         breast        Social History     Socioeconomic History     Marital status:      Spouse name: Not on file     Number of children: 3     Years of education: Not on file     Highest education level: Not on file   Occupational History     Not on file   Tobacco Use     Smoking status: Never Smoker     Smokeless tobacco: Never Used   Substance and Sexual Activity     Alcohol use: No     Drug use: No     Sexual activity: Never   Other Topics Concern     Not on file   Social History Narrative     50 years with 3 grown  children and 2 adult grandchildren. Resides with  in [facility] they own Retired  and  provider HS graduate 2yr College degree     Social Determinants of Health     Financial Resource Strain: Not on file   Food Insecurity: Not on file   Transportation Needs: Not on file   Physical Activity: Not on file   Stress: Not on file   Social Connections: Not on file   Intimate Partner Violence: Not on file   Housing Stability: Not on file           Medications  Allergies   Current Outpatient Medications   Medication Sig Dispense Refill     donepezil (ARICEPT) 10 MG tablet Take 1 tablet (10 mg) by mouth daily 30 tablet 11     furosemide (LASIX) 40 MG tablet Take 1 tablet (40 mg) by mouth daily 90 tablet 1     isosorbide mononitrate (IMDUR) 30 MG 24 hr tablet Take 0.5 tablets (15 mg total) by mouth 2 (two) times a day.       levETIRAcetam (KEPPRA) 100 MG/ML solution Take 5 mLs (500 mg) by mouth 2 times daily 300 mL 11     melatonin 5 MG CAPS        metoprolol tartrate (LOPRESSOR) 25 MG tablet Take 1 tablet (25 mg total) by mouth 2 (two) times a day.       multivitamin, therapeutic with minerals (THERA-VIT-M) TABS tablet Take 1 tablet by mouth       potassium chloride ER (KLOR-CON M) 10 MEQ CR tablet Take 1 tablet (10 mEq) by mouth daily 90 tablet 1     rivaroxaban ANTICOAGULANT (XARELTO) 15 MG TABS tablet Take by mouth daily (with dinner)       rosuvastatin (CRESTOR) 40 MG tablet Take 1 tablet (40 mg) by mouth daily 90 tablet 1       Allergies   Allergen Reactions     Clarithromycin Swelling     Neck Swelling            Lab Results    Chemistry/lipid CBC Cardiac Enzymes/BNP/TSH/INR   Recent Labs   Lab Test 11/30/21  1635   CHOL 152   HDL 47*   LDL 83   TRIG 112     Recent Labs   Lab Test 11/30/21  1635 08/01/18  0927 07/29/15  1448   LDL 83 70 70     Recent Labs   Lab Test 11/30/21  1635      POTASSIUM 3.8   CHLORIDE 109*   CO2 26      BUN 13   CR 0.67   GFRESTIMATED 78   PING 9.9     Recent Labs    Lab Test 11/30/21  1635 02/16/21  0940 01/13/21  1328   CR 0.67 0.64 0.63     No results for input(s): A1C in the last 21389 hours.       No results for input(s): WBC, HGB, HCT, MCV, PLT in the last 65138 hours.  No results for input(s): HGB in the last 88325 hours. No results for input(s): TROPONINI in the last 03487 hours.  No results for input(s): BNP, NTBNPI, NTBNP in the last 20868 hours.  Recent Labs   Lab Test 03/14/19  1321   TSH 2.60     No results for input(s): INR in the last 14653 hours.     Garry Maynard MD

## 2022-03-31 NOTE — PROGRESS NOTES
Garry Maynard MD   3/29/2022  5:04 PM CDT         Potassium is mildly low at 3.2.  Son cares for the patient.  Patient by report is on 40 mg of furosemide and 10 mEq of potassium.  Would recommend that the potassium be increased from 10 mEq daily to 20 mEq daily.  With repeat potassium and magnesium in 7 to 10 days.  Thank you KULDEEP Maynard     Potassium rx updated.  Potassium and mag labs ordered.  Message sent to [facility].  [facility]

## 2022-04-19 NOTE — TELEPHONE ENCOUNTER
[facility]    Phone Message    May a detailed message be left on voicemail: yes     Reason for Call: Other: Patient's son was calling regarding the potassium level lab that was recently drawn and if the patient should contiue on her current dose since it was previously increased due to her levels being low. Please call and discuss.     Action Taken: Other: Cardiology    Travel Screening: Not Applicable

## 2022-04-20 NOTE — TELEPHONE ENCOUNTER
LVM stating per previous discussion:    Shilpi Basilio RN   4/14/2022  4:05 PM CDT Back to Top        Results and recommendations discussed with son Claudio.  Claudio verbalized understanding and had no questions.  -george Maynard MD   4/13/2022  9:39 PM CDT         Labs ok, potassium is normal now, continue with current dosing of potassium, should follow up with labs with her primary in a few months  mdg     Asked for call back to [phone] with any questions.  hubert

## 2022-10-23 PROBLEM — R41.0 CONFUSION: Status: ACTIVE | Noted: 2022-01-01

## 2022-10-23 PROBLEM — R09.02 HYPOXIA: Status: ACTIVE | Noted: 2022-01-01

## 2022-10-23 PROBLEM — Z86.711 HISTORY OF PULMONARY EMBOLISM: Status: ACTIVE | Noted: 2022-01-01

## 2022-10-23 PROBLEM — S09.90XA INJURY OF HEAD, INITIAL ENCOUNTER: Status: ACTIVE | Noted: 2022-01-01

## 2022-10-23 NOTE — PHARMACY-ADMISSION MEDICATION HISTORY
Pharmacy Note - Admission Medication History    Pertinent Provider Information: patient's son helps with all medications. States she must have all meds crushed and  Mixed with a spoon of pudding     ______________________________________________________________________    Prior To Admission (PTA) med list completed and updated in EMR.       PTA Med List   Medication Sig Last Dose     donepezil (ARICEPT) 10 MG tablet Take 1 tablet (10 mg) by mouth daily 10/22/2022 at pm     furosemide (LASIX) 40 MG tablet Take 1 tablet (40 mg) by mouth daily 10/22/2022 at am     isosorbide mononitrate (IMDUR) 30 MG 24 hr tablet Take 15 mg by mouth 2 times daily 10/22/2022 at pm     levETIRAcetam (KEPPRA) 100 MG/ML solution Take 5 mLs (500 mg) by mouth 2 times daily 10/22/2022 at pm     melatonin 5 MG CAPS Take 5 mg by mouth nightly as needed Past Week     metoprolol tartrate (LOPRESSOR) 25 MG tablet Take 1 tablet (25 mg) by mouth daily 10/22/2022     multivitamin CF FORMULA (CHOICEFUL) chewable tablet Take 1 tablet by mouth daily 10/22/2022     potassium chloride ER (KLOR-CON M) 10 MEQ CR tablet Take 2 tablets (20 mEq) by mouth daily 10/22/2022     rivaroxaban ANTICOAGULANT (XARELTO) 15 MG TABS tablet Take by mouth daily (with dinner) 10/22/2022 at pm     rosuvastatin (CRESTOR) 40 MG tablet Take 1 tablet (40 mg) by mouth daily 10/22/2022 at pm       Information source(s): Family member and CareEverywhere/SureScripts  Method of interview communication: in-person    Summary of Changes to PTA Med List  New: none  Discontinued: none  Changed: none    Patient was asked about OTC/herbal products specifically.  PTA med list reflects this.    In the past week, patient estimated taking medication this percent of the time:  greater than 90%.    Allergies were reviewed, assessed, and updated with the patient.      Patient does not use any multi-dose medications prior to admission.    The information provided in this note is only as accurate as  the sources available at the time of the update(s).    Thank you for the opportunity to participate in the care of this patient.    Nohemi Joseph  10/23/2022 11:13 AM

## 2022-10-23 NOTE — H&P
[facility] Ad[facility] History and Physical    Beverly Sutton   [address]  [facility] [address]  : 1932  Admission Date/Time: 10/23/2022  9:25 AM    Primary Care Provider / Referring Physician: Leslie Loaiza  [facility] Attending Physician:  Terry Durham DO     Assessment:     Principal Problem:    Confusion  Active Problems:    Complex partial seizure disorder (H)    Hyperlipidemia    Late onset Alzheimer disease (H)    Von Recklinghausen's disease (H)    Coronary atherosclerosis    Dyslipidemia    Hypoxia    Injury of head, initial encounter    History of pulmonary embolism      Plan:    89 year old female with history of seizures, prior PE on xarelto, dementia, CAD presents with confusion.     Confusion: suspect toxic or metabolic encephalopathy with underlying dementia  -- head CT negative  -- check UA, blood cultures, TSH, Ammonia, LFT's, Vit B12 and folate  -- consider neurological evaluation if reversible etiology not found  -- haldol and restraints as needed to manage behaviors  -- continue home aricept      Acute hypoxemic respiratory failure: Mild, currently on 2L NC.   Unclear etiology, CXR with some congestion however BNP normal range. Recently completed course of augmentin for complaints of cough  -- check CTA chest  -- wean oxygen as tolerated  -- continue home diuretic pending further results from CTA chest      Troponinemia: question stress response, does have history of CAD but less concern currently for ACS. Trop only mildly elevated at 20 with delta trop 22  -- follow up CTA for signs of CHF and diurese if needed  -- monitor on tele  -- continue home lasix, imdur, statin and metoprolol      History of PE: continue xarelto, follow up results of CTA      History of seizures:   -- continue home keppra  -- check keppra level  -- consider EEG and neurology eval pending above workup      DVT PPX: on xarelto    Code status:  DNR       Terry Durham  D.O.          _____________________________________________________________  CHIEF COMPLAINT:   confusion    HPI:    89 year old female with history of seizures, prior PE on xarelto, dementia, CAD presents with confusion.   Patient's son states the patient recently completed course of augmentin for cough. Now with increasing confusion last day with new head bruise noted. No history of trauma or falls.   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)      Heart disease      Hyperlipidemia      Influenza B 01/18/2017     Neurofibromatosis, type 1 (H)      Optic atrophy of right eye      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     Pulmonary embolism (H) 01/18/2017     Tremor     chronic, now w/c bound        Past Surgical History:   Procedure Laterality Date     ARTHROSCOPY SHOULDER ROTATOR CUFF REPAIR Bilateral      CARDIAC CATHETERIZATION       CHOLECYSTECTOMY      per son     h/o heart surgery  2006     MASTECTOMY Left 2013     MASTECTOMY Right 1995     MASTECTOMY       ROTATOR CUFF REPAIR RT/LT Right 2000     ROTATOR CUFF REPAIR RT/LT Left 2003     STRABISMUS SURGERY      age 15     ZZC CABG, VEIN, SINGLE      Description: CABG (CABG);  Proc Date: 10/30/2007;  Comments: LIMA to Distal LAD; ; SVG to PDA; ; Both grafts patent on Angiogram 9/09       REVIEW OF SYSTEMS:   Unable to reliably be obtained due to patient factors    Social History     Socioeconomic History     Marital status:      Spouse name: Not  "on file     Number of children: 3     Years of education: Not on file     Highest education level: Not on file   Occupational History     Not on file   Tobacco Use     Smoking status: Never     Smokeless tobacco: Never   Substance and Sexual Activity     Alcohol use: No     Drug use: No     Sexual activity: Never   Other Topics Concern     Not on file   Social History Narrative     50 years with 3 grown children and 2 adult grandchildren. 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 College degree     Social Determinants of Health     Financial Resource Strain: Not on file   Food Insecurity: Not on file   Transportation Needs: Not on file   Physical Activity: Not on file   Stress: Not on file   Social Connections: Not on file   Intimate Partner Violence: Not on file   Housing Stability: Not on file          Family History   Problem Relation Age of Onset     Glaucoma Mother      Coronary Artery Disease Mother      Alzheimer Disease Mother      Cancer Father         pancreas     Cancer Sister      Cancer Mother         breast       PHYSICAL EXAM:  General Appearance: In no acute distress  BP (!) 164/84   Pulse 99   Temp 99.5  F (37.5  C) (Rectal)   Resp 25   Ht 1.575 m (5' 2\")   SpO2 94%   BMI 21.95 kg/m    EYES: Clear, without inflammation   HEENT: Without congestion or inflammation  RESPIRATORY: respirations nonlabored  CARDIOVASCULAR: trace le edema bilat.  ABDOMEN: soft and non-tender  RECTAL: deferred  GENITOURINARY: deferred  NEUROLOGIC: No focal arm or leg  weakness, somnolent        Labs Reviewed:   Recent Results (from the past 24 hour(s))   CBC (+ platelets, no diff)    Collection Time: 10/23/22 10:44 AM   Result Value Ref Range    WBC Count 13.4 (H) 4.0 - 11.0 10e3/uL    RBC Count 3.98 3.80 - 5.20 10e6/uL    Hemoglobin 13.5 11.7 - 15.7 g/dL    Hematocrit 39.4 35.0 - 47.0 %    MCV 99 78 - 100 fL    MCH 33.9 (H) 26.5 - 33.0 pg    MCHC 34.3 31.5 - " 36.5 g/dL    RDW 12.5 10.0 - 15.0 %    Platelet Count 245 150 - 450 10e3/uL   Basic metabolic panel    Collection Time: 10/23/22 10:44 AM   Result Value Ref Range    Sodium 140 136 - 145 mmol/L    Potassium 3.8 3.4 - 5.3 mmol/L    Chloride 104 98 - 107 mmol/L    Carbon Dioxide (CO2) 24 22 - 29 mmol/L    Anion Gap 12 7 - 15 mmol/L    Urea Nitrogen 14.5 8.0 - 23.0 mg/dL    Creatinine 0.63 0.51 - 0.95 mg/dL    Calcium 9.6 8.2 - 9.6 mg/dL    Glucose 146 (H) 70 - 99 mg/dL    GFR Estimate 84 >60 mL/min/1.73m2   Troponin T, High Sensitivity (now)    Collection Time: 10/23/22 10:44 AM   Result Value Ref Range    Troponin T, High Sensitivity 20 (H) <=14 ng/L   Nt probnp inpatient    Collection Time: 10/23/22 10:44 AM   Result Value Ref Range    N terminal Pro BNP Inpatient 793 0 - 1,800 pg/mL   INR    Collection Time: 10/23/22 10:44 AM   Result Value Ref Range    INR 2.10 (H) 0.85 - 1.15   Extra Green Top (Lithium Heparin) ON ICE    Collection Time: 10/23/22 10:44 AM   Result Value Ref Range    Hold Specimen JI    Procalcitonin    Collection Time: 10/23/22 10:44 AM   Result Value Ref Range    Procalcitonin 0.07 (H) <0.05 ng/mL   Hepatic panel    Collection Time: 10/23/22 10:44 AM   Result Value Ref Range    Protein Total 6.8 6.4 - 8.3 g/dL    Albumin 3.8 3.5 - 5.2 g/dL    Bilirubin Total 1.2 <=1.2 mg/dL    Alkaline Phosphatase 78 35 - 104 U/L    AST 29 10 - 35 U/L    ALT 25 10 - 35 U/L    Bilirubin Direct 0.25 0.00 - 0.30 mg/dL   Magnesium    Collection Time: 10/23/22 10:44 AM   Result Value Ref Range    Magnesium 2.0 1.7 - 2.3 mg/dL   Asymptomatic COVID-19 Virus (Coronavirus) by PCR Nasopharyngeal    Collection Time: 10/23/22 10:49 AM    Specimen: Nasopharyngeal; Swab   Result Value Ref Range    SARS CoV2 PCR Negative Negative

## 2022-10-23 NOTE — ED NOTES
Bed: JNEDP-07  Expected date: 10/23/22  Expected time:   Means of arrival: Ambulance  Comments:  SPF  89 F

## 2022-10-23 NOTE — ED PROVIDER NOTES
NAME: Beverly Sutton  AGE: 89 year old female  YOB: 1932  MRN: [id number]  EVALUATION DATE & TIME: 10/23/2022  9:25 AM    PCP: Leslie Loaiza  ED PROVIDER: Maegan La MD.    Chief Complaint   Patient presents with     Altered Mental Status       FINAL IMPRESSION:  1. Hypoxia    2. Confusion    3. Injury of head, initial encounter        MEDICAL DECISION MAKIN:21 AM I met with the patient, obtained history from EMS, performed an initial exam here in the ED.   9:58 AM Patient's son arrived to the ED. I performed an initial interview. Discussed plan for treatment and workup in the ED. Discussed intubation and resuscitation with the patient's son, patient's son states that he would rather not have intubation or resuscitation especially concerning breaking ribs.   12:48 PM I rechecked and updated the patient. I paged the hospitalist  1:02 PM I discussed the case with hospitalist, Dr Durham     89 y/o F with h/o HFpEF, CAD, Alzheimer's, PE on xarelto, complex partial seizures, among others who presents with altered mental status. Per son patient reported chest pain today and was also found to have a bruise to the right side of her head and he is not sure how she got that. Reports she has had a cough and compelted recent course of augmentin. He states she does seem somewhat more confused today than baseline. She is hypoxic to mid 80s on arrival and continues to pull supplmental O2 off. IM haldol given to faciliate IV, labs, O2. Discussed code status with patient's son and he states his mother would not want CPR or inbutation. No POLST/paperwork found in chart.  Dx includes but not limited to ACS, PE, dissection (less likely equal pulses, not hypertensive), pneumonia/infection, ICH, metabolic abnormality among others.  EKG with artifact but appears sinus without ST elevation. Troponin mildly elevated at 20, will get delta troponin. Mild leukocytosis, procalcitonin only minimally elevated at  0.07. BNP within normal range at 793. CXR with vascular congestion and mild edema. Patient is much more comfortable after haldol, is needing some soft restraints for prevent removal of lines/oxygen. Family in agreement with admission. Given she is more cooperative at this time we will also get CT chest PE scan to further evaluate for other cause of hypoxia. Hospitalist accepted patient for admission.      MEDICATIONS GIVEN IN THE EMERGENCY:  Medications   haloperidol lactate (HALDOL) injection 2 mg (has no administration in time range)   donepezil (ARICEPT) tablet 10 mg (has no administration in time range)   furosemide (LASIX) tablet 40 mg (has no administration in time range)   isosorbide mononitrate (IMDUR) 24 hr half-tab 15 mg (has no administration in time range)   levETIRAcetam (KEPPRA) solution 500 mg (has no administration in time range)   metoprolol tartrate (LOPRESSOR) tablet 25 mg (has no administration in time range)   rivaroxaban ANTICOAGULANT (XARELTO) tablet 15 mg (has no administration in time range)   rosuvastatin (CRESTOR) tablet 40 mg (has no administration in time range)   potassium chloride ER (KLOR-CON M) CR tablet 20 mEq (has no administration in time range)   haloperidol lactate (HALDOL) injection 2 mg (2 mg Intramuscular Given 10/23/22 0932)   haloperidol lactate (HALDOL) injection 2 mg (2 mg Intramuscular Given 10/23/22 1027)       NEW PRESCRIPTIONS STARTED AT TODAY'S ER VISIT:  New Prescriptions    No medications on file        =================================================================  HPI    Patient information was obtained from: EMS and patient's son  Use of : N/A       Beverly Sutton is a 89 year old female with a past medical history of hyperlipidemia, neurofibromatosis, breast cancer, pulmonary embolism on xarelto, and dementia, who presents to the ED via ambulance for feeling unwell and altered mental status.    Per EMS, patient arrives from son's home for  altered mental status. The son reported that the patient seemed like she was feeling unwell today and called the ambulance for concern of the patient. He noted a bruise to the side of her head. EMS states that the state of the living situation was poor.     Per patient's son, patient called to son in order to get out of bed with complaints of chest pain. He noticed that the patient was warm to the touch with temperatures ranging from 98.8 to 100.3 and sustained a bruise to her temple. Additionally, patient's son notes that the patient has been more confused compared to baseline. He does note that 2 weeks ago, the patient developed a cough which was diagnosed as a pneumonia which was treated with Augmentin. Denies recent falls, diarrhea, vomiting, or additional symptoms or complaints at this time.     HPI limited secondary to dementia    REVIEW OF SYSTEMS   Review of Systems   Unable to perform ROS: Dementia   Cardiovascular: Positive for chest pain.   Psychiatric/Behavioral: Positive for confusion.        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     Pulmonary embolism (H) 01/18/2017     Tremor     chronic, now w/c bound        PAST SURGICAL HISTORY:  Past Surgical History:   Procedure Laterality Date     ARTHROSCOPY SHOULDER ROTATOR CUFF REPAIR Bilateral      CARDIAC CATHETERIZATION       CHOLECYSTECTOMY      per son     h/o heart surgery  2006     MASTECTOMY Left 2013     MASTECTOMY Right 1995      MASTECTOMY       ROTATOR CUFF REPAIR RT/LT Right 2000     ROTATOR CUFF REPAIR RT/LT Left 2003     STRABISMUS SURGERY      age 15     ZC CABG, VEIN, SINGLE      Description: CABG (CABG);  Proc Date: 10/30/2007;  Comments: LIMA to Distal LAD; ; SVG to PDA; ; Both grafts patent on Angiogram 9/09       CURRENT MEDICATIONS:      Current Facility-Administered Medications:      donepezil (ARICEPT) tablet 10 mg, 10 mg, Oral, Daily, Terry Durham,      furosemide (LASIX) tablet 40 mg, 40 mg, Oral, Daily, Terry Durham,      haloperidol lactate (HALDOL) injection 2 mg, 2 mg, Intramuscular, Q6H PRN, Terry Durham,      isosorbide mononitrate (IMDUR) 24 hr half-tab 15 mg, 15 mg, Oral, BID, Terry Durham,      levETIRAcetam (KEPPRA) solution 500 mg, 500 mg, Oral, BID, Terry Durham,      metoprolol tartrate (LOPRESSOR) tablet 25 mg, 25 mg, Oral, Daily, Terry Durham,      potassium chloride ER (KLOR-CON M) CR tablet 20 mEq, 20 mEq, Oral, Daily, Terry Durham,      rivaroxaban ANTICOAGULANT (XARELTO) tablet 15 mg, 15 mg, Oral, Daily with supper, Terry Durham,      rosuvastatin (CRESTOR) tablet 40 mg, 40 mg, Oral, Daily, Terry Durham,     Current Outpatient Medications:      donepezil (ARICEPT) 10 MG tablet, Take 1 tablet (10 mg) by mouth daily, Disp: 30 tablet, Rfl: 11     furosemide (LASIX) 40 MG tablet, Take 1 tablet (40 mg) by mouth daily, Disp: 90 tablet, Rfl: 4     isosorbide mononitrate (IMDUR) 30 MG 24 hr tablet, Take 15 mg by mouth 2 times daily, Disp: , Rfl:      levETIRAcetam (KEPPRA) 100 MG/ML solution, Take 5 mLs (500 mg) by mouth 2 times daily, Disp: 300 mL, Rfl: 11     melatonin 5 MG CAPS, Take 5 mg by mouth nightly as needed, Disp: , Rfl:      metoprolol tartrate (LOPRESSOR) 25 MG tablet, Take 1 tablet (25 mg) by mouth daily, Disp: 90 tablet, Rfl: 4     multivitamin CF FORMULA (CHOICEFUL) chewable tablet, Take 1 tablet by mouth daily, Disp: , Rfl:  "     potassium chloride ER (KLOR-CON M) 10 MEQ CR tablet, Take 2 tablets (20 mEq) by mouth daily, Disp: 180 tablet, Rfl: 1     rivaroxaban ANTICOAGULANT (XARELTO) 15 MG TABS tablet, Take by mouth daily (with dinner), Disp: , Rfl:      rosuvastatin (CRESTOR) 40 MG tablet, Take 1 tablet (40 mg) by mouth daily, Disp: 90 tablet, Rfl: 4    ALLERGIES:  Allergies   Allergen Reactions     Clarithromycin Swelling     Neck Swelling         FAMILY HISTORY:  Family History   Problem Relation Age of Onset     Glaucoma Mother      Coronary Artery Disease Mother      Alzheimer Disease Mother      Cancer Father         pancreas     Cancer Sister      Cancer Mother         breast       SOCIAL HISTORY:   Social History     Socioeconomic History     Marital status:      Number of children: 3   Tobacco Use     Smoking status: Never     Smokeless tobacco: Never   Substance and Sexual Activity     Alcohol use: No     Drug use: No     Sexual activity: Never   Social History Narrative     50 years with 3 grown children and 2 adult grandchildren. Resides with  in [facility] they own Retired  and  provider HS graduate 2yr College degree       PHYSICAL EXAM:    Vitals: /87   Pulse 92   Temp 99.5  F (37.5  C) (Rectal)   Resp 17   Ht 1.575 m (5' 2\")   SpO2 97%   BMI 21.95 kg/m     Constitutional: Elderly female, pulling at oxygen tubing  HENT: Normocephalic, large bruise to right side of head, mucous membranes moist. Neck-gross ROM intact, no C spine tenderness  Eyes: Refuses to open eyes, no discharge noted  Respiratory: CTAB, no respiratory distress, no wheezing  Cardiovascular: Normal heart rate, regular rhythm. Mild LE edema.  GI: Soft, not distended, not tender to palpation, no palpable masses  Musculoskeletal: Moving all 4 extremities intentionally and without pain. No obvious deformity.  Skin: Warm, dry, no rash.  Neurologic: Alert, face symmetric, not answering questions, moving all " extremities spontaneously      LAB:  All pertinent labs reviewed and interpreted.  Labs Ordered and Resulted from Time of ED Arrival to Time of ED Departure   CBC WITH PLATELETS - Abnormal       Result Value    WBC Count 13.4 (*)     RBC Count 3.98      Hemoglobin 13.5      Hematocrit 39.4      MCV 99      MCH 33.9 (*)     MCHC 34.3      RDW 12.5      Platelet Count 245     BASIC METABOLIC PANEL - Abnormal    Sodium 140      Potassium 3.8      Chloride 104      Carbon Dioxide (CO2) 24      Anion Gap 12      Urea Nitrogen 14.5      Creatinine 0.63      Calcium 9.6      Glucose 146 (*)     GFR Estimate 84     TROPONIN T, HIGH SENSITIVITY - Abnormal    Troponin T, High Sensitivity 20 (*)    INR - Abnormal    INR 2.10 (*)    PROCALCITONIN - Abnormal    Procalcitonin 0.07 (*)    NT PROBNP INPATIENT - Normal    N terminal Pro BNP Inpatient 793     COVID-19 VIRUS (CORONAVIRUS) BY PCR - Normal    SARS CoV2 PCR Negative     HEPATIC FUNCTION PANEL - Normal    Protein Total 6.8      Albumin 3.8      Bilirubin Total 1.2      Alkaline Phosphatase 78      AST 29      ALT 25      Bilirubin Direct 0.25     MAGNESIUM - Normal    Magnesium 2.0     ROUTINE UA WITH MICROSCOPIC REFLEX TO CULTURE   KEPPRA (LEVETIRACETAM) LEVEL   TROPONIN T, HIGH SENSITIVITY   AMMONIA   VITAMIN B12   FOLATE       RADIOLOGY:  XR Chest Port 1 View   Final Result   IMPRESSION: Postoperative cardiomediastinal silhouette is unchanged. Vascular congestion and mild edema present. Bibasilar atelectasis with no pleural effusion or pneumothorax.      Cervical spine CT w/o contrast   Final Result   IMPRESSION:   1.  No acute cervical spine fracture.      Head CT w/o contrast   Final Result   IMPRESSION:   1.  No acute intracranial injury.         CT Chest Pulmonary Embolism w Contrast    (Results Pending)       EKG:   Performed at: 11:19  Impression: Sinus rhythm, low voltage QRS  Rate: 96  Rhythm: Sinus  QRS Interval: 76  QTc Interval: 442  Comparison: When  compared with 1/18/17, no significant changes were found  I have independently reviewed and interpreted the EKG(s) documented above.     PROCEDURES:   Procedures     I, Terrance Salinas, am serving as a scribe to document services personally performed by Dr. Maegan La based on my observation and the provider's statements to me. I, Maegan La MD attest that Terrance Salinas is acting in a scribe capacity, has observed my performance of the services and has documented them in accordance with my direction.    Maegan La M.D.  Emergency Medicine  [facility] EMERGENCY DEPARTMENT  [address] [place] [id number]  [phone]  Dept: [phone]     Maegan La MD  10/23/22 4281

## 2022-10-23 NOTE — ED TRIAGE NOTES
Pt received to rp-7 vis ems, per son that takes care of pt in her home states that pt has a hx of dementia, alzheimer's and is blind, but more confused from baseline this morning, son noted that pt has a large bruise to right forehead that is new, he states that pt did not fall as she's bed ridden and does not ambulate. Son states that pt does take xarelto and last dose was last evening.     Triage Assessment       Row Name 10/23/22 0939       Triage Assessment (Adult)    Airway WDL WDL       Respiratory WDL    Respiratory WDL X  dimished bilat bases.       Skin Circulation/Temperature WDL    Skin Circulation/Temperature WDL WDL       Cardiac WDL    Cardiac WDL X  refuses to have cardiac monitor applied.       Cognitive/Neuro/Behavioral WDL    Cognitive/Neuro/Behavioral WDL X    Level of Consciousness confused    Orientation disoriented x 4    Mood/Behavior agitated;uncooperative      Row Name 10/23/22 0986       Triage Assessment (Adult)    Airway WDL WDL

## 2022-10-23 NOTE — CONSULTS
"Care Management Initial Consult    General Information  Assessment completed with: Rafy Mancuso son via phone  Type of CM/SW Visit: Initial Assessment    Primary Care Provider verified and updated as needed: Yes   Readmission within the last 30 days: no previous admission in last 30 days      Reason for Consult: discharge planning  Advance Care Planning: Advance Care Planning Reviewed: no concerns identified          Communication Assessment  Patient's communication style: spoken language (English or Bilingual)             Cognitive  Cognitive/Neuro/Behavioral: \"she has a diagnosis of alzheimers and is almost completely blind\"    Living Environment:   People in home: child(joe), adult  Beverly and sons Rafy and Neil  Current living Arrangements: house      Able to return to prior arrangements: other (see comments) (unknown at this time)       Family/Social Support:  Care provided by: child(joe) (\"2 sons provide 24/7 care\")  Provides care for: no one, unable/limited ability to care for self  Marital Status:   Children          Description of Support System: Supportive, Involved    Support Assessment: Adequate social supports, Adequate family and caregiver support, Patient communicates needs well met    Current Resources:   Patient receiving home care services: No     Community Resources: None  Equipment currently used at home: wheelchair, manual, hospital bed, commode chair, tub bench, lift device, grab bar, tub/shower, grab bar, toilet (\"skye lift is needed PRN, most often she pivot transfers\")  Supplies currently used at home: Incontinence Supplies, Wipes, Other (\"oximeter\")    Employment/Financial:  Employment Status: retired     Employment/ Comments: \"no  benefits\"  Financial Concerns:     Referral to Financial Worker: No       Lifestyle & Psychosocial Needs:  Social Determinants of Health     Tobacco Use: Low Risk      Smoking Tobacco Use: Never     Smokeless Tobacco Use: Never     " "Passive Exposure: Not on file   Alcohol Use: Not on file   Financial Resource Strain: Not on file   Food Insecurity: Not on file   Transportation Needs: Not on file   Physical Activity: Not on file   Stress: Not on file   Social Connections: Not on file   Intimate Partner Violence: Not on file   Depression: Not on file   Housing Stability: Not on file       Functional Status:  Prior to admission patient needed assistance:   Dependent ADLs:: Wheelchair-with assist, Bathing, Dressing, Grooming, Incontinence, Positioning, Transfers, Toileting (She eats on her own with help from sons who show her where the food is on her plate since she is legally blind.)  Dependent IADLs:: Cleaning, Cooking, Laundry, Shopping, Meal Preparation, Medication Management, Money Management, Transportation, Incontinence (\"incontinent at night, but they have a toileting plan for her during the day every few hours\")  Assesssment of Functional Status: At functional baseline    Mental Health Status:          Chemical Dependency Status:                Values/Beliefs:  Spiritual, Cultural Beliefs, Bahai Practices, Values that affect care:                 Additional Information:  Beverly Cassidy lives in a house with her 2 sons Rafy and Neil. Rafy states, \"we are both her care givers, but I am her main caregiver. One of us is with her 24/7\".    She is total cares at baseline and wheelchair or bed bound and legally blind. They are well set up at home with a lot of equipment (see list above). She is able to feed herself still, but her sons do need to show her where the food is on her plate since she is legally blind.    She is still most often able to pivot transfer for her sons. They also have a skye lift if needed.    Unknown discharge needs at this time, but should be able to return home with sons caring for her.    [facility] transport at discharge.    Izabela Duval RN      "

## 2022-10-23 NOTE — ED NOTES
Pt seen by dr rey, orders received, pt medicated with haldol im x2 and soft restraints applied to bilat upper ext for confusion and refusal to wear o2 with sats 83-85% on room air. Pt more cooperative after haldol and with restraints, able to keep o2 4l nc in place with sats 95-98%, iv started, blood drawn and covid swab sent. Son at bs and aware of plan of care.

## 2022-10-24 NOTE — ED NOTES
Pt RA sats in 70s. Oxygen placed on pt and continuously is pulling it off. Palliative care in room talking with pts sons.

## 2022-10-24 NOTE — PROGRESS NOTES
10/24/22 1030   Appointment Info   Signing Clinician's Name / Credentials (OT) madhav redding OT   Appointment Cancel Comments (OT) pt has 24/7 assist for adl's and transfers at home. No acute OT needs. chart states sons care for the patient and have equipment needed at home. will complete the order.

## 2022-10-24 NOTE — CONSULTS
[facility]  Palliative Care Consultation Note    Patient: Beverly Sutton  Date of Admission:  10/23/2022    Requesting Clinician / Team: Hospitalist  Reason for consult: Goals of care    Impression & Recommendations:  eBverly Cassidy has advanced dementia and is unable to engage in goals of care conversations. Her son Rafy is her primary caretaker, along with her son Neil.     Family would like to continue current therapies and treat any reversible causes of Beverly Cassidy's hypoxia and altered mental status with limits of DNR/DNI and no escalation of care to ICU. If Beverly Cassidy gets worse or fails to improve, family will consider transition to comfort measures.    Goals of Care: life-prolonging with limits of DNR/DNI and no escalation of care to ICU    Advanced Care Planning:  Advance directive: No  POLST: No  Code status: DNR/DNI  Health care agent/surrogate: Rafy (son)    Symptom Management:  Not currently managing symptoms    Psychosocial & Spiritual:   Not assessed today    These recommendations have been discussed with Dr. Anders.      Thank you for the opportunity to participate in the care of this patient and family. Our team: will continue to follow.     During regular M-F work hours -- if you are not sure who specifically to contact -- please contact us by calling us directly at the Palliative Care Main Line [phone]    After regular work hours and on weekends/holidays, you can leave a message at [phone]      Assessments:  Beverly Cassidy is a 89 y/o woman with advanced dementia, seizure disorder, CAD, prior PE on xarelto, admitted on 10/23/2022 with acute encephalopathy and hypoxia. Course complicated by agitation requiring chemical and physical restraints.    Today, the patient was seen for:  Goals of care    Prognosis, Goals, & Planning:      Functional Status just prior to hospitalization: Palliative Performance Score:       20%- 1. Totally bed bound; 2. Unable to do any activity,  "extensive disease; 3. Total care; 4. Minimal to sips; 5. Full or drowsy +/- confusion       Prognosis, Goals, and/or Advance Care Planning were addressed today: Yes      Patient's decision making preferences: unable to assess          Patient has decision-making capacity today for complex decisions: No            I have concerns about the patient/family's health literacy today: No           Patient has a completed Health Care Directive: No.       Code status: No CPR / No Intubation    Coping, Meaning, & Spirituality:   Mood, coping, and/or meaning in the context of serious illness were addressed today: Yes    Social:     Living situation: home    Key family / caregivers: 2 sons provide 24/7 care    Current in-home services: none    History of Present Illness:  History gathered today from: family/loved ones, medical chart    Beverly Cassidy lives at home with her sons Rafy and Neil who together provide 24/7 care. She has advanced dementia and is nearly blind, dependent in nearly all ADLs. She is wheelchair bound and incontinent. She is able to feed herself. Beverly Cassidy often forgets where she is (A&Ox1 at baseline).     Beverly Cassidy was in her USOH per son until yesterday when she felt warm, temp up to 101. He also checked her oxygen (SpO2 76%) and HR (100s). Rafy says Beverly Cassidy was not more confused than normal, nor did she appear out of breath. In recent months, Rafy says his mom has been doing \"very well\" at home. She eats regularly and engages in conversation with him. Rafy has noticed that his mom is not talking much here in the hospital, he has not been able to arouse her all day.    I tried to wake Beverly Cassidy up, she did not respond to verbal stimulation. When I pulled down her covers, she tried to pull them back up and squeezed my hands tightly. Her oxygen had come off, so we replaced it and she said \"stop it\" and \"shut up\" several times. eBverly Cassidy's eyes remained closed, which is her baseline per son.    I " explained the role of the Palliative Care team to Rafy and Neil and provided a brief medical update. Reviewed code status, Rafy confirmed Beverly Cassidy is DNR/DNI and they would not want any aggressive treatments. Family in agreement with no escalation of care to ICU. Rafy does want to continue all current treatments and treat any reversible causes if possible. He is ok with chemical and physical restraints if needed.     Explored idea of hospice or comfort-focused care should Beverly Cassidy get worse, family is amenable.    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)      Heart disease      Hyperlipidemia      Influenza B 01/18/2017     Neurofibromatosis, type 1 (H)      Optic atrophy of right eye      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     Pulmonary embolism (H) 01/18/2017     Tremor     chronic, now w/c bound         Past Surgical History:  Past Surgical History:   Procedure Laterality Date     ARTHROSCOPY SHOULDER ROTATOR CUFF REPAIR Bilateral      CARDIAC CATHETERIZATION       CHOLECYSTECTOMY      per son     h/o heart surgery  2006     MASTECTOMY Left 2013     MASTECTOMY Right 1995     MASTECTOMY       ROTATOR CUFF REPAIR RT/LT Right 2000     ROTATOR CUFF REPAIR RT/LT Left 2003     STRABISMUS SURGERY      age 15     ZZC CABG, VEIN, SINGLE      Description: CABG (CABG);  Proc Date: 10/30/2007;  Comments: LIMA to Distal LAD; ; SVG to PDA; ; Both grafts patent on Angiogram 9/09         Family History:  Family History   Problem Relation Age of Onset      Glaucoma Mother      Coronary Artery Disease Mother      Alzheimer Disease Mother      Cancer Father         pancreas     Cancer Sister      Cancer Mother         breast         Allergies:  Allergies   Allergen Reactions     Clarithromycin Swelling     Neck Swelling          Medications:  I have reviewed this patient's medication profile and medications from this hospitalization.   Noted:  Current Facility-Administered Medications   Medication     acetaminophen (TYLENOL) tablet 650 mg    Or     acetaminophen (TYLENOL) Suppository 650 mg     calcium carbonate (TUMS) chewable tablet 1,000 mg     donepezil (ARICEPT) tablet 10 mg     [Held by provider] furosemide (LASIX) tablet 40 mg     haloperidol lactate (HALDOL) injection 2 mg     hydrALAZINE (APRESOLINE) injection 10 mg     [Held by provider] isosorbide mononitrate (IMDUR) 24 hr half-tab 15 mg     levETIRAcetam (KEPPRA) solution 500 mg     melatonin tablet 3 mg     metoprolol tartrate (LOPRESSOR) tablet 25 mg     ondansetron (ZOFRAN ODT) ODT tab 4 mg    Or     ondansetron (ZOFRAN) injection 4 mg     potassium chloride ER (KLOR-CON M) CR tablet 20 mEq     QUEtiapine (SEROquel) half-tab 12.5 mg     rivaroxaban ANTICOAGULANT (XARELTO) tablet 15 mg     rosuvastatin (CRESTOR) tablet 40 mg     senna-docusate (SENOKOT-S/PERICOLACE) 8.6-50 MG per tablet 1 tablet    Or     senna-docusate (SENOKOT-S/PERICOLACE) 8.6-50 MG per tablet 2 tablet     sodium chloride 0.9% infusion     Current Outpatient Medications   Medication     donepezil (ARICEPT) 10 MG tablet     furosemide (LASIX) 40 MG tablet     isosorbide mononitrate (IMDUR) 30 MG 24 hr tablet     levETIRAcetam (KEPPRA) 100 MG/ML solution     melatonin 5 MG CAPS     metoprolol tartrate (LOPRESSOR) 25 MG tablet     multivitamin CF FORMULA (CHOICEFUL) chewable tablet     potassium chloride ER (KLOR-CON M) 10 MEQ CR tablet     rivaroxaban ANTICOAGULANT (XARELTO) 15 MG TABS tablet     rosuvastatin (CRESTOR) 40 MG tablet  "        PERTINENT PHYSICAL EXAMINATION:  Vital Signs: Blood pressure 98/54, pulse 86, temperature (P) 97.8  F (36.6  C), resp. rate 22, height 1.575 m (5' 2\"), weight 54.4 kg (120 lb), SpO2 93 %.   GENERAL: laying in bed with eyes closed, NAD, chronically ill  SKIN: Warm and dry   HEENT: normocephalic  LUNGS: non-labored  ABDOMINAL: soft, non distended, non tender  MUSKL: no gross joint deformities   NEUROLOGIC: does not respond to verbal stimulation but pulls covers back up if removed and squeezes my hands tightly, says \"shut up\"    Data reviewed:  Recent imaging reviewed, my comments on pertinents:   CT PE 10/23  IMPRESSION:  1.  No evidence of pulmonary embolus.  2.  Moderate bibasilar atelectasis.    CT C-spine 10/23  IMPRESSION:  1.  No acute cervical spine fracture.    CT Head 10/23  IMPRESSION:  1.  No acute intracranial injury.    Recent lab data reviewed, my comments on pertinents:   CMP  Recent Labs   Lab 10/24/22  0800 10/23/22  1044   * 140   POTASSIUM 4.1 3.8   CHLORIDE 100 104   CO2 25 24   ANIONGAP 10 12   * 146*   BUN 10.6 14.5   CR 0.58 0.63   GFRESTIMATED 85 84   PING 9.7* 9.6   MAG  --  2.0   PROTTOTAL  --  6.8   ALBUMIN  --  3.8   BILITOTAL  --  1.2   ALKPHOS  --  78   AST  --  29   ALT  --  25     CBC  Recent Labs   Lab 10/24/22  0800 10/23/22  1044   WBC 13.3* 13.4*   RBC 4.13 3.98   HGB 14.0 13.5   HCT 41.2 39.4    99   MCH 33.9* 33.9*   MCHC 34.0 34.3   RDW 12.8 12.5    245       Kalie Castrejon PA-C  [facility]  Dept phone: [phone]  Securely message with the Vocera Web Console    "

## 2022-10-24 NOTE — ED NOTES
Patient cleaned and brief replaced.  ROM also performed and patient repositioned. Son remains at bedside. Patient remains confused but has been much less agitated and is tolerating soft restraints. Patient intermittently works restraints loose so restraints checked frequently as patient still wants to remove her nasal cannula. Patient noted to desat with activity but O2 sats improve once patient quits moving as much.

## 2022-10-24 NOTE — PROGRESS NOTES
Physical Therapy: Orders received. Chart reviewed and discussed with care team.? Physical Therapy not indicated due to patient is dependent for mobility at baseline and receives 24/7 care from her sons. No acute PT needs identified.? Defer discharge recommendations to  and family.? Will complete orders.     Lorena Méndez, PT, DPT

## 2022-10-24 NOTE — PLAN OF CARE
Soft wrist restraints removed at 8am for ADL's, no need to replace (discontinued) Heart rate improved after morning medications (metoprolol) continues on oxygen.  Son Claudio at bedside.  Did not attempt to get up as Woody is wheelchair bound at home.  At home Woody sleeps until noon and is often up late into the evening and night.  Allowed turning and repositioning and bed bath without resistance. Fed herself (finger foods) with assistance from her son.  Falls and pressure ulcer prevention plans in place. Unable to obtain urine sample -son does not want us to attempt a straight cath.  Hali Vizcaino RN      Problem: Risk for Delirium  Goal: Optimal Coping  Outcome: Progressing  Goal: Improved Behavioral Control  Outcome: Progressing  Goal: Improved Attention and Thought Clarity  Outcome: Progressing  Goal: Improved Sleep  Outcome: Progressing

## 2022-10-24 NOTE — PROGRESS NOTES
[facility]    PROGRESS NOTE - Hospitalist Service    Assessment and Plan    Principal Problem:    Confusion  Active Problems:    Complex partial seizure disorder (H)    Hyperlipidemia    Late onset Alzheimer disease (H)    Von Recklinghausen's disease (H)    Coronary atherosclerosis    Dyslipidemia    Hypoxia    Injury of head, initial encounter    History of pulmonary embolism    89 year old female with past medical history of seizures, prior PE on xarelto, dementia, CAD presents to the ER with confusion.      Acute encephalopathy  - suspect toxic or metabolic encephalopathy   - underlying advanced dementia  - Check urinalysis  - haldol and restraints as needed to manage behaviors  - continue home aricept  - Psychiatric consult appreciate input  - Repeat CT head as patient is on anticoagulation with head trauma  - Start scheduled Seroquel 3 times daily     Acute hypoxemic respiratory failure:   - Mild, currently on 2L NC.   - Probably secondary to atelectasis, CXR with some congestion however BNP normal range.   - Recently completed course of augmentin for complaints of cough  - Unremarkable CTA chest except for bilateral moderate bibasilar atelectasis  - wean oxygen as tolerated  - Hold home diuretics as blood pressure is low and patient has poor oral intake     Elevated troponin  -Mild, question stress response, does have history of CAD but less concern currently for ACS.   - Trop only mildly elevated at 20 with delta trop 22  - Discontinue telemetry monitoring  - Check echo  - Hold Imdur and Lasix because of low blood pressure     History of PE:   - continue xarelto  - Negative CTA for PE    History of seizures:   - continue home keppra  - Therapeutic keppra level    Significant weakness and deconditioning  - Patient is currently wheelchair-bound  - PT/OT evaluation    Advanced dementia  - Progressive deterioration as per son who is the caregiver  - Resume home Aricept  - Palliative care  consult for goals of care, appreciate input    VTE prophylaxis:  DOAC  DIET: Orders Placed This Encounter      Combination Diet Regular Diet Adult      Disposition/Barriers to discharge: Repeat CT head, significant confusion, palliative care consult  Code Status: No CPR- Do NOT Intubate    Subjective:  Beverly is very confused still, was on restraint yesterday.  Son at bedside.    PHYSICAL EXAM  There were no vitals filed for this visit.  B/P:98/54 T:98.1 P:86 R:22     Intake/Output Summary (Last 24 hours) at 10/24/2022 1147  Last data filed at 10/24/2022 0000  Gross per 24 hour   Intake 50 ml   Output --   Net 50 ml      Body mass index is 21.95 kg/m .    Constitutional: Lethargic, visible bruises on right frontal area, patient is blind and hard of hearing appears stated age  Respiratory: No increased work of breathing, good air exchange, clear to auscultation bilaterally, no crackles or wheezing  Cardiovascular: Normal apical impulse, regular rate and rhythm, normal S1 and S2, no S3 or S4, and no murmur noted  GI: No scars, normal bowel sounds, soft, non-distended, non-tender, no masses palpated, no hepatosplenomegally  Skin: no bruising or bleeding and normal skin color, texture, turgor  Musculoskeletal: There is no redness, warmth, or swelling of the joints.  Full range of motion noted.  no lower extremity pitting edema present  Neurologic: Lethargic, restless and very confused.    Neuropsychiatric: Appropriate with examiner      PERTINENT LABS/IMAGING:  Recent Labs   Lab 10/24/22  0800 10/23/22  1044   WBC 13.3* 13.4*   HGB 14.0 13.5    99    245   INR  --  2.10*   * 140   POTASSIUM 4.1 3.8   CHLORIDE 100 104   CO2 25 24   BUN 10.6 14.5   CR 0.58 0.63   ANIONGAP 10 12   PING 9.7* 9.6   * 146*   ALBUMIN  --  3.8   PROTTOTAL  --  6.8   BILITOTAL  --  1.2   ALKPHOS  --  78   ALT  --  25   AST  --  29     Recent Results (from the past 24 hour(s))   Head CT w/o contrast    Narrative    EXAM:  CT HEAD WITHOUT CONTRAST  LOCATION: [facility]  DATE/TIME: 10/23/2022, 12:12 PM    INDICATION: Bruise on head.  COMPARISON: Brain MRI 01/19/2017.  TECHNIQUE: Routine CT Head without IV contrast. Multiplanar reformats. Dose reduction techniques were used.    FINDINGS:  INTRACRANIAL CONTENTS: No intracranial hemorrhage, extra-axial collection, or mass effect.  No CT evidence of acute infarct. Mild presumed chronic small vessel ischemic changes. Moderate generalized volume loss. No hydrocephalus.     VISUALIZED ORBITS/SINUSES/MASTOIDS: Enlarged right optic nerve sheath complex and left orbital intraconal mass are grossly unchanged versus 2017. 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 Mild mucosal thickening in the ethmoid and   right maxillary sinuses. No middle ear or mastoid effusion.    BONES/SOFT TISSUES: No acute abnormality.      Impression    IMPRESSION:  1.  No acute intracranial injury.     Cervical spine CT w/o contrast    Narrative    EXAM: CT CERVICAL SPINE W/O CONTRAST  LOCATION: [facility]  DATE/TIME: 10/23/2022 12:14 PM    INDICATION: Right forehead bruising. Head injury.  COMPARISON: None.  TECHNIQUE: Routine CT Cervical Spine without IV contrast. Multiplanar reformats. Dose reduction techniques were used.    FINDINGS:  Mild convex right curvature. No subluxation. Cervical vertebral body heights preserved.  No acute cervical spine fracture. No prevertebral soft tissue swelling. Multilevel degenerative changes without evidence for high-grade spinal canal narrowing.      Impression    IMPRESSION:  1.  No acute cervical spine fracture.   XR Chest Port 1 View    Narrative    EXAM: XR CHEST PORT 1 VIEW  LOCATION: [facility]  DATE/TIME: 10/23/2022 12:17 PM    INDICATION: Hypoxia.  COMPARISON: 1/18/2017      Impression    IMPRESSION: Postoperative cardiomediastinal silhouette is unchanged. Vascular congestion  and mild edema present. Bibasilar atelectasis with no pleural effusion or pneumothorax.   CT Chest Pulmonary Embolism w Contrast    Narrative    EXAM: CT CHEST PULMONARY EMBOLISM W CONTRAST  LOCATION: [facility]  DATE/TIME: 10/23/2022 4:47 PM    INDICATION: hypoxia, ho PE  COMPARISON: None.  TECHNIQUE: CT chest pulmonary angiogram during arterial phase injection of IV contrast. Multiplanar reformats and MIP reconstructions were performed. Dose reduction techniques were used.   CONTRAST: Isovue 370    100ml    FINDINGS:  ANGIOGRAM CHEST: Pulmonary arteries are normal caliber and negative for pulmonary emboli. Thoracic aorta is negative for dissection. No CT evidence of right heart strain.    LUNGS AND PLEURA: Moderate bibasilar atelectasis. Biapical pleural-parenchymal scarring and calcification. Decreased lung volumes.    MEDIASTINUM/AXILLAE: No significant mediastinal or hilar adenopathy. Global mild cardiomegaly.    CORONARY ARTERY CALCIFICATION: Previous intervention (stents or CABG).    UPPER ABDOMEN: Cholecystectomy. Probable left hepatic lobe cyst.    MUSCULOSKELETAL: Median sternotomy . T11 and T7 25% compression deformities.      Impression    IMPRESSION:  1.  No evidence of pulmonary embolus.  2.  Moderate bibasilar atelectasis.       Discussed with patient, family, nursing staff and discharge planner    Ezekiel Anders MD  [facility] Medicine Service  [phone]

## 2022-10-24 NOTE — ED NOTES
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)     Heart disease     Hyperlipidemia     Influenza B 01/18/2017    Neurofibromatosis, type 1 (H)     Optic atrophy of right eye     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    Is the Patient Confused:  Yes    Last Food or Drink: 10/24/22 at ordered dinner    Focused Assessment:  pt lives at home with son. Is very into her care. Pt has alzheimers and son feels pt is more confused then normal. Pt has bruise on head and son is not sure where that came from but is negative CT. Pt has some hypoxia and currently has NC on 4l and satting 94%. Is fidgety and pulls oxygen off and desats. Had soft restraints on earlier and they were removed. Does have pure wick on and urine just sent. Meds are taken by crushing. Pt was seen " by palliative care and son wants everything done. Everything. Did agree to make her DNR/DNI    Tests Performed: Done: Labs and Imaging    Treatments Provided:  crushed pills     Family Dynamics/Concerns: No    Family Updated On Visitor Policy: Yes    Plan of Care Communicated to Family: Yes    Who Was Updated about Plan of Care: rashard Cordeor Checklist Done and Signed by Patient: Yes    Medications sent with patient: na    Covid: asymptomatic , negative    Additional Information: follow up on urine    RN: Shaina Bush RN  RN 10/24/2022 4:57 PM

## 2022-10-25 NOTE — CONSULTS
Referral Received - [facility]     ______________________________________________________________________ Providers: Please contact [facility] with changes in orders or clinical plan of care   Nursing: Please contact [facility] with significant changes in patient condition  ______________________________________________________________________    [facility] would like to thank you for the [facility] referral.    Referral received.and initial insurance information sent to  Hospice intake.    We are reviewing your patient's eligibility with intake team and medical director at this time.    Our plan is to visit your patient as soon as possible. We will update the Charge Nurse on the unit with [facility] eligibility.    A meeting has been scheduled with Neil Hlilman, and Jessica (by phone) for 10/26/22 at around 1000a in Beverly's room.    Rafy reports the family having no prior experience with Hospice. He lamented about not knowing the resource was available, as Chris and his brother Neil provide the care for Beverly at home now.    Chris is looking forward to the learning. We are pleased to be a part of Beverly's care at this time in her life.        MARIANA COCHRAN, RN on 10/25/2022 at 4:42 PM

## 2022-10-25 NOTE — CONSULTS
"  INITIAL PSYCHIATRIC CONSULTATION                  REASON FOR REQUEST: Acute encephalopathy with agitation      ASSESSMENT/RECOMMENDATIONS/PLAN :   Patient symptoms are suggestive of acute encephalopathy likely due to being in hospital, change in her routine, unfamiliar environment and people and underlying dementia.  It is pertinent to focus on regulating day and night cycle, to promote sleep.  Reassure and redirect frequently.  Encouraged family visits.  Patient's sons are great support and helping manage behavioral dysregulation.  Returning to her familiar environment would be the most optimal in minimizing further cognitive decline due to hospital induced delirium.         Metabolic encephalopathy, acute likely superimposed on subacute delirium in the setting of underlying dementia, hospital environment..  Cognitive and behavioral changes due to above.  Restlessness and agitation due to above  Sleep difficulties.  Sundowning behavior.    Recommendations:  Donepezil 10 mg at bedtime.  Keppra 500 mg twice daily \"helps with mood also  Seroquel 12.5 mg 3 times a day and 25 mg at bedtime  Melatonin 5 mg at bedtime.  Case discussed with bedside RN and care management     Case discussed in detail with Claudio, patient's son, describing the need for medications to regulate sleep, reduce agitation and anxiety and also to target sundowning behaviors. His plan is to take her home as both sons are available providing 24 hour care to their mother.     MENTAL STATUS EXAMINATION:   Appearance: Stated age,  arousable, fluctuating levels of consciousness. Flinches when feet are touched, No hyperreflexia, No grimacing. NAD.  Speech: Minimal   Thought Process: Sleepy .   Thought content: Does not appear to respond to internally generated stimuli, no acute visual or auditory hallucination;     No manic symptoms, no paranoia no delusional thoughts.  Thought Formation:  loosening of association   Judgment: Impaired  Insight : " "Impaired  Attention : Sleepy   Memory: Depressed  Fund Of Knowledge: Depressed  Affect: Neutral  Mood: Congruent  Alert and Oriented: Fluctuating. O x 0-1  Comprehension: Depressed   Generative thought content: Reduced  Language: Intact  Gait and Ambulation: With assist of one.  Problem solving: Impaired.   Cognitive set Shifting: Impaired         /72 (BP Location: Right arm)   Pulse 111   Temp 98.8  F (37.1  C) (Oral)   Resp 18   Ht 1.575 m (5' 2\")   Wt 54.4 kg (120 lb)   SpO2 94%   BMI 21.95 kg/m        HISTORY OF PRESENT ILLNESS:   Presenting history to include: Per [facility] [place]/Specialists:   HPI:    89 year old female with history of seizures, prior PE on xarelto, dementia, CAD presents with confusion.   Patient's son states the patient recently completed course of augmentin for cough. Now with increasing confusion last day with new head bruise noted. No history of trauma or falls.   Patient is unable to provide reliable history   Upon assessment patient was noted to be sleeping comfortably in bed.  She was arousable to tactile stimuli however would not open her eyes nor engage in conversation.  When moved her hands or feet she did not grimace in pain nor retracted it.  Collateral information was obtained from patient's son Claudio who was at bedside.  Patient has lived in the same house since 1970 with her  who passed away several years ago.  Since then her 2 sons, Rafy is present at bedside, have been living with her in order to take care of her.  She noted to have cognitive decline after her 's passing and at that time her sons decided to be there to take care of her.    She was diagnosed with dementia in 2018 but remained functional and independent with ADLs with assist from her sons.  She was started on \"medications\" and she did well living in her home.  Claudio further describes that She has confusion and disorientation but usually not to the extent needing \"medication\" at home, " "referring to additional psychotropics that she is receiving in hospital.    \"She does not do much and just stays in her bed and calls us for help if she needs anything\".  She has symptoms of sundowning described as \"gets little restless restless and confused in the evening and at night\".  She sleeps regularly and when she is not asleep at night she stays in bed.  There is no history of premorbid psychiatric illness per chart review.  According to patient's son her cognitive changes became pertinent after her  of 50 years passed away 7 years ago.  Overall Rafy wants to keep his mother home and hoping to take her home when she is ready to discharge from the hospital.  She was brought to hospital via EMS after her son found her O2 sats to be at 70% with a mild grade fever.  Upon admission she was noted to be confused, hypoxic or metabolic encephalopathy was suspected in the setting of acute hypoxemic respiratory failure noted to be mild on 2 L nasal cannula oxygen.  History significant for seizures.  Labs reviewed in its entirety.  Her sodium was at 135-> now 141. Vit B 12 447    Review of Systems:As per HPI. Remainders of 12 point review of systems negative.  Psychiatric ROS:  Patient is not providing any meaningful information.  She is noted to be physically comfortable sleeping.      PFSH reviewed  and not pertinent to chief complaint/reason for visit  /72 (BP Location: Right arm)   Pulse 111   Temp 98.8  F (37.1  C) (Oral)   Resp 18   Ht 1.575 m (5' 2\")   Wt 54.4 kg (120 lb)   SpO2 94%   BMI 21.95 kg/m    No results found for: AMPHET, PCP, BARBIT, OXYCODONE, THC, ETOH  @24HOURRESULTS@  Recent Results (from the past 72 hour(s))   CBC (+ platelets, no diff)    Collection Time: 10/23/22 10:44 AM   Result Value Ref Range    WBC Count 13.4 (H) 4.0 - 11.0 10e3/uL    RBC Count 3.98 3.80 - 5.20 10e6/uL    Hemoglobin 13.5 11.7 - 15.7 g/dL    Hematocrit 39.4 35.0 - 47.0 %    MCV 99 78 - 100 fL    MCH " 33.9 (H) 26.5 - 33.0 pg    MCHC 34.3 31.5 - 36.5 g/dL    RDW 12.5 10.0 - 15.0 %    Platelet Count 245 150 - 450 10e3/uL   Basic metabolic panel    Collection Time: 10/23/22 10:44 AM   Result Value Ref Range    Sodium 140 136 - 145 mmol/L    Potassium 3.8 3.4 - 5.3 mmol/L    Chloride 104 98 - 107 mmol/L    Carbon Dioxide (CO2) 24 22 - 29 mmol/L    Anion Gap 12 7 - 15 mmol/L    Urea Nitrogen 14.5 8.0 - 23.0 mg/dL    Creatinine 0.63 0.51 - 0.95 mg/dL    Calcium 9.6 8.2 - 9.6 mg/dL    Glucose 146 (H) 70 - 99 mg/dL    GFR Estimate 84 >60 mL/min/1.73m2   Troponin T, High Sensitivity (now)    Collection Time: 10/23/22 10:44 AM   Result Value Ref Range    Troponin T, High Sensitivity 20 (H) <=14 ng/L   Nt probnp inpatient    Collection Time: 10/23/22 10:44 AM   Result Value Ref Range    N terminal Pro BNP Inpatient 793 0 - 1,800 pg/mL   INR    Collection Time: 10/23/22 10:44 AM   Result Value Ref Range    INR 2.10 (H) 0.85 - 1.15   Keppra (Levetiracetam) Level    Collection Time: 10/23/22 10:44 AM   Result Value Ref Range    Keppra (Levetiracetam) Level 11.2 10.0 - 40.0  g/mL   Extra Green Top (Lithium Heparin) ON ICE    Collection Time: 10/23/22 10:44 AM   Result Value Ref Range    Hold Specimen JI    Procalcitonin    Collection Time: 10/23/22 10:44 AM   Result Value Ref Range    Procalcitonin 0.07 (H) <0.05 ng/mL   Hepatic panel    Collection Time: 10/23/22 10:44 AM   Result Value Ref Range    Protein Total 6.8 6.4 - 8.3 g/dL    Albumin 3.8 3.5 - 5.2 g/dL    Bilirubin Total 1.2 <=1.2 mg/dL    Alkaline Phosphatase 78 35 - 104 U/L    AST 29 10 - 35 U/L    ALT 25 10 - 35 U/L    Bilirubin Direct 0.25 0.00 - 0.30 mg/dL   Vitamin B12    Collection Time: 10/23/22 10:44 AM   Result Value Ref Range    Vitamin B12 447 232 - 1,245 pg/mL   Magnesium    Collection Time: 10/23/22 10:44 AM   Result Value Ref Range    Magnesium 2.0 1.7 - 2.3 mg/dL   Asymptomatic COVID-19 Virus (Coronavirus) by PCR Nasopharyngeal    Collection Time:  10/23/22 10:49 AM    Specimen: Nasopharyngeal; Swab   Result Value Ref Range    SARS CoV2 PCR Negative Negative   Troponin T, High Sensitivity (now)    Collection Time: 10/23/22  2:09 PM   Result Value Ref Range    Troponin T, High Sensitivity 22 (H) <=14 ng/L   Folate    Collection Time: 10/23/22  2:09 PM   Result Value Ref Range    Folic Acid 37.6 (H) 4.6 - 34.8 ng/mL   Ammonia    Collection Time: 10/23/22  2:59 PM   Result Value Ref Range    Ammonia <10 (L) 11 - 51 umol/L   Blood Culture Line, venous    Collection Time: 10/23/22  2:59 PM    Specimen: Line, venous; Blood   Result Value Ref Range    Culture No growth after 1 day    Basic metabolic panel    Collection Time: 10/24/22  8:00 AM   Result Value Ref Range    Sodium 135 (L) 136 - 145 mmol/L    Potassium 4.1 3.4 - 5.3 mmol/L    Chloride 100 98 - 107 mmol/L    Carbon Dioxide (CO2) 25 22 - 29 mmol/L    Anion Gap 10 7 - 15 mmol/L    Urea Nitrogen 10.6 8.0 - 23.0 mg/dL    Creatinine 0.58 0.51 - 0.95 mg/dL    Calcium 9.7 (H) 8.2 - 9.6 mg/dL    Glucose 122 (H) 70 - 99 mg/dL    GFR Estimate 85 >60 mL/min/1.73m2   CBC with platelets    Collection Time: 10/24/22  8:00 AM   Result Value Ref Range    WBC Count 13.3 (H) 4.0 - 11.0 10e3/uL    RBC Count 4.13 3.80 - 5.20 10e6/uL    Hemoglobin 14.0 11.7 - 15.7 g/dL    Hematocrit 41.2 35.0 - 47.0 %     78 - 100 fL    MCH 33.9 (H) 26.5 - 33.0 pg    MCHC 34.0 31.5 - 36.5 g/dL    RDW 12.8 10.0 - 15.0 %    Platelet Count 207 150 - 450 10e3/uL   TSH with free T4 reflex    Collection Time: 10/24/22  8:00 AM   Result Value Ref Range    TSH 2.46 0.30 - 4.20 uIU/mL   Echocardiogram Complete    Collection Time: 10/24/22  3:30 PM   Result Value Ref Range    LVEF  >70%    UA with Microscopic reflex to Culture    Collection Time: 10/24/22  4:45 PM    Specimen: Urine, Catheter   Result Value Ref Range    Color Urine Light Yellow Colorless, Straw, Light Yellow, Yellow    Appearance Urine Clear Clear    Glucose Urine Negative  Negative mg/dL    Bilirubin Urine Negative Negative    Ketones Urine Negative Negative mg/dL    Specific Gravity Urine 1.025 1.001 - 1.030    Blood Urine Negative Negative    pH Urine 5.5 5.0 - 7.0    Protein Albumin Urine 10 (A) Negative mg/dL    Urobilinogen Urine <2.0 <2.0 mg/dL    Nitrite Urine Negative Negative    Leukocyte Esterase Urine Negative Negative    Mucus Urine Present (A) None Seen /LPF    RBC Urine 1 <=2 /HPF    WBC Urine <1 <=5 /HPF    Squamous Epithelials Urine 1 <=1 /HPF    Hyaline Casts Urine 1 <=2 /LPF   CBC with platelets    Collection Time: 10/25/22  4:59 AM   Result Value Ref Range    WBC Count 11.5 (H) 4.0 - 11.0 10e3/uL    RBC Count 3.68 (L) 3.80 - 5.20 10e6/uL    Hemoglobin 12.4 11.7 - 15.7 g/dL    Hematocrit 37.9 35.0 - 47.0 %     (H) 78 - 100 fL    MCH 33.7 (H) 26.5 - 33.0 pg    MCHC 32.7 31.5 - 36.5 g/dL    RDW 12.8 10.0 - 15.0 %    Platelet Count 195 150 - 450 10e3/uL   Comprehensive metabolic panel    Collection Time: 10/25/22  4:59 AM   Result Value Ref Range    Sodium 141 136 - 145 mmol/L    Potassium 3.9 3.4 - 5.3 mmol/L    Chloride 107 98 - 107 mmol/L    Carbon Dioxide (CO2) 26 22 - 29 mmol/L    Anion Gap 8 7 - 15 mmol/L    Urea Nitrogen 13.5 8.0 - 23.0 mg/dL    Creatinine 0.59 0.51 - 0.95 mg/dL    Calcium 9.1 8.2 - 9.6 mg/dL    Glucose 90 70 - 99 mg/dL    Alkaline Phosphatase 68 35 - 104 U/L    AST 31 10 - 35 U/L    ALT 18 10 - 35 U/L    Protein Total 5.8 (L) 6.4 - 8.3 g/dL    Albumin 3.0 (L) 3.5 - 5.2 g/dL    Bilirubin Total 1.1 <=1.2 mg/dL    GFR Estimate 85 >60 mL/min/1.73m2   Procalcitonin    Collection Time: 10/25/22  4:59 AM   Result Value Ref Range    Procalcitonin 0.20 (H) <0.05 ng/mL       PMH:   Past Medical History:   Diagnosis Date     Arthritis of knee      Breast cancer (H)      Cancer (H)     breast     Cataract     r eye with lid lag     Cataracts, bilateral      Cognitive decline      Complex partial seizure disorder (H) 7/15/2016    Dysrhythmia grade III  Lt Temporal     Coronary artery disease      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)      Heart disease      Hyperlipidemia      Influenza B 01/18/2017     Neurofibromatosis, type 1 (H)      Optic atrophy of right eye      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     Pulmonary embolism (H) 01/18/2017     Tremor     chronic, now w/c bound            Current Medications:Scheduled Meds:    acetaminophen  975 mg Oral TID     donepezil  10 mg Oral At Bedtime     [Held by provider] furosemide  40 mg Oral Daily     influenza vac high-dose quad  0.7 mL Intramuscular Prior to discharge     [Held by provider] isosorbide mononitrate  15 mg Oral BID     levETIRAcetam  500 mg Oral BID     metoprolol tartrate  25 mg Oral Daily     potassium chloride ER  20 mEq Oral Daily     QUEtiapine  12.5 mg Oral TID     rivaroxaban ANTICOAGULANT  15 mg Oral Daily with supper     rosuvastatin  40 mg Oral At Bedtime     Continuous Infusions:  PRN Meds:.acetaminophen **OR** acetaminophen, calcium carbonate, haloperidol lactate, hydrALAZINE, melatonin, ondansetron **OR** ondansetron, senna-docusate **OR** senna-docusate                Family History: PERSONALLY REVIEWED.  Family History   Problem Relation Age of Onset     Glaucoma Mother      Coronary Artery Disease Mother      Alzheimer Disease Mother      Cancer Father         pancreas     Cancer Sister      Cancer Mother         breast     Pertinent Family hx not pertinent to Chief Complaint or reason for visit.     Social History:  PERSONALLY REVIEWED.  Social History     Socioeconomic History     Marital status:      Spouse name: Not on file     Number of children: 3     Years of education: Not on file     Highest education level: Not on file   Occupational History     Not on file   Tobacco Use     Smoking status: Never     Smokeless tobacco: Never   Substance and Sexual Activity     Alcohol use: No     Drug use: No     Sexual activity: Never   Other Topics Concern     Not on file    Social History Narrative     50 years with 3 grown children and 2 adult grandchildren. 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             Allergies as of 06/01/2014 Reviewed     Review of Systems:As per HPI. Remainders of 12 point review of systems negative.    Review of Pertinent Laboratory:      PERSONALLY REVIEWED.    Physical Exam: Temp:  [97.4  F (36.3  C)-98.8  F (37.1  C)] 98.8  F (37.1  C)  Pulse:  [] 111  Resp:  [18-24] 18  BP: ()/(51-74) 137/72  SpO2:  [77 %-99 %] 94 %   Vitals: reviewed in chart     Physical exam as per medical team: reviewed in chart      diagnoses, risk and benefits of medications discussed with staff. Care coordination with care management team.   Thank you for this consultation.       Heather Lozoya; NP  [facility]        This note was created with the help of Dragon dictation system. Grammatical and typing errors are not intentional.

## 2022-10-25 NOTE — PLAN OF CARE
Problem: Gas Exchange Impaired  Goal: Optimal Gas Exchange  Outcome: Progressing  Intervention: Optimize Oxygenation and Ventilation  Recent Flowsheet Documentation  Taken 10/25/2022 0148 by Jazmyn Reese, RN  Head of Bed (HOB) Positioning: HOB at 30 degrees     Problem: Risk for Delirium  Goal: Improved Sleep  Outcome: Progressing     Problem: Hypertension Comorbidity  Goal: Blood Pressure in Desired Range  Intervention: Maintain Blood Pressure Management  Recent Flowsheet Documentation  Taken 10/25/2022 0148 by Jazmyn Reese, RN  Medication Review/Management: medications reviewed   Goal Outcome Evaluation:  Pt is comfortable, no sign of pain, slept well and calm. No sign of distress. On 5L on Oxygen nasal cannula at 99% O2 sat. Still with bruises to rt side of forehead.

## 2022-10-25 NOTE — PLAN OF CARE
Problem: Plan of Care - These are the overarching goals to be used throughout the patient stay.    Goal: Absence of Hospital-Acquired Illness or Injury  Intervention: Identify and Manage Fall Risk  Recent Flowsheet Documentation  Taken 10/24/2022 1800 by Lynn Botello RN  Safety Promotion/Fall Prevention: activity supervised     Problem: Risk for Delirium  Goal: Improved Behavioral Control  Intervention: Minimize Safety Risk  Recent Flowsheet Documentation  Taken 10/24/2022 1800 by Lynn Botello RN  Enhanced Safety Measures: bed alarm set     Problem: Heart Failure Comorbidity  Goal: Maintenance of Heart Failure Symptom Control  Intervention: Maintain Heart Failure Management  Recent Flowsheet Documentation  Taken 10/24/2022 1800 by Lynn Botello RN  Medication Review/Management: medications reviewed     Problem: Hypertension Comorbidity  Goal: Blood Pressure in Desired Range  Intervention: Maintain Blood Pressure Management  Recent Flowsheet Documentation  Taken 10/24/2022 1800 by Lynn Botello RN  Medication Review/Management: medications reviewed   Goal Outcome Evaluation:       Patient alert, no pain symptoms observed. She arrived on the unit about 1745 from the  ED. She came up with oxygen @ 5 L NC but she constantly attempts to remove the oxygen prongs from her nose. She has her eyes closed shot,  Accompanied by sons.

## 2022-10-25 NOTE — PROGRESS NOTES
[facility]    Medicine Progress Note - Hospitalist Service       Date of Admission:  10/23/2022    Assessment & Plan        89 year old female with past medical history of seizures, prior PE on xarelto, dementia, CAD presents to the ER with confusion.      Acute encephalopathy  - suspect toxic or metabolic encephalopathy   - underlying advanced dementia  - haldol and restraints as needed to manage behaviors  - continue home aricept  - Psychiatric consult appreciate input  - Repeat CT head as patient is on anticoagulation with head trauma  - Scheduled Seroquel 3 times daily  - 10/25 U/A: negative  - Psych consult appreciated.      Acute hypoxemic respiratory failure:   - Mild, currently on 2L NC.   - Probably secondary to atelectasis, CXR with some congestion however BNP normal range.   - Recently completed course of augmentin for complaints of cough  - Unremarkable CTA chest except for bilateral moderate bibasilar atelectasis  - wean oxygen as tolerated  - Hold home diuretics as blood pressure is low and patient has poor oral intake  - 10/25: Hold IVF. Leukocytosis trending down. Procal: 0.2     Elevated troponin  -Mild, question stress response, does have history of CAD but less concern currently for ACS.   - Trop only mildly elevated at 20 with delta trop 22  - Discontinue telemetry monitoring  - echo: The estimated left ventricular ejection fraction is greater than  70%.  - Hold Imdur and Lasix because of low blood pressure     History of PE:   - continue xarelto  - Negative CTA for PE     History of seizures:   - continue home keppra  - Therapeutic keppra level    Hyponatremia  - improving  - f/u BMP     Significant weakness and deconditioning  - Patient is currently wheelchair-bound  - likely due to dementia     Advanced dementia  - Progressive deterioration as per son who is the caregiver  - Resume home Aricept  - Palliative care consult for goals of care, appreciate input  - 10/25:  meeting with hospice on 10/26     Hospital Day: 3        Diet: Combination Diet Regular Diet Adult  Room Service    DVT Prophylaxis: Moderate risk. Xarelto   Mcgovern Catheter: Not present  Central Lines: None  Code Status: No CPR- Do NOT Intubate        Disposition Plan   Disposition: Home Hospice consult pending     Expected Discharge Date: 10/26/2022    Discharge Delays: Oxygen Needs - Arrange Home O2         Medically ready to discharge today: No     The patient's care was discussed with the Patient's Family.    Deanna Llanes MD  Hospitalist Service  [facility]  Securely message with the Vocera Web Console (learn more here)  Text page via [employer] Paging/Directory         Clinically Significant Risk Factors         # Hyponatremia: Lowest Na = 135 mmol/L (Ref range: 136-145) in last 2 days, will monitor as appropriate   # Hypercalcemia: corrected calcium is >10.1, will monitor as appropriate    # Hypoalbuminemia: Lowest albumin = 3 g/dL (Ref range: 3.5-5.2) at 10/25/2022  4:59 AM, will monitor as appropriate                  ____________        Physical Exam   Vital Signs: Temp: 98.8  F (37.1  C) Temp src: Oral BP: 122/60 Pulse: 93   Resp: 19 SpO2: 92 % O2 Device: Nasal cannula Oxygen Delivery: 4 LPM  Weight: 120 lbs 0 oz  Physical Exam:  GEN: Lethargic. Not in acute distress.  HEENT: Atraumatic, mucous membrane- moist and pink.  Chest: Bilateral air entry.  CVS: S1S2 regular. No murmurs, rubs or gallops.  Abdomen: Soft. Non-tender, non-distended. No organomegaly. No guarding or rigidity. Bowel sounds active.   Extremities: No pedal edema.  CNS: No focal neurologic deficit. No involuntary movements.  Skin: No skin rash, no cyanosis or clubbing.     Data   Recent Results (from the past 24 hour(s))   CBC with platelets    Collection Time: 10/25/22  4:59 AM   Result Value Ref Range    WBC Count 11.5 (H) 4.0 - 11.0 10e3/uL    RBC Count 3.68 (L) 3.80 - 5.20 10e6/uL    Hemoglobin 12.4 11.7 -  15.7 g/dL    Hematocrit 37.9 35.0 - 47.0 %     (H) 78 - 100 fL    MCH 33.7 (H) 26.5 - 33.0 pg    MCHC 32.7 31.5 - 36.5 g/dL    RDW 12.8 10.0 - 15.0 %    Platelet Count 195 150 - 450 10e3/uL   Comprehensive metabolic panel    Collection Time: 10/25/22  4:59 AM   Result Value Ref Range    Sodium 141 136 - 145 mmol/L    Potassium 3.9 3.4 - 5.3 mmol/L    Chloride 107 98 - 107 mmol/L    Carbon Dioxide (CO2) 26 22 - 29 mmol/L    Anion Gap 8 7 - 15 mmol/L    Urea Nitrogen 13.5 8.0 - 23.0 mg/dL    Creatinine 0.59 0.51 - 0.95 mg/dL    Calcium 9.1 8.2 - 9.6 mg/dL    Glucose 90 70 - 99 mg/dL    Alkaline Phosphatase 68 35 - 104 U/L    AST 31 10 - 35 U/L    ALT 18 10 - 35 U/L    Protein Total 5.8 (L) 6.4 - 8.3 g/dL    Albumin 3.0 (L) 3.5 - 5.2 g/dL    Bilirubin Total 1.1 <=1.2 mg/dL    GFR Estimate 85 >60 mL/min/1.73m2   Procalcitonin    Collection Time: 10/25/22  4:59 AM   Result Value Ref Range    Procalcitonin 0.20 (H) <0.05 ng/mL     ____________  Interval History    Patient seen and examined at bedside. Son is by the bedside. Patient was sleeping. Discussed case with Son.  Data reviewed today: I reviewed all medications, new labs and imaging results over the last 24 hours. I personally reviewed no images or EKG's today.

## 2022-10-25 NOTE — PROGRESS NOTES
[facility]  Palliative Care Daily Progress Note      Code status: No CPR- Do NOT Intubate     Impressions, Recommendations & Counseling     SYMPTOM ASSESSMENT:   Generalized weakness  - reposition for comfort    Advanced Care Planning:  Advance directive: No  POLST: No  Code status: DNR/DNI  Health care agent/surrogate: Rafy (son)    Goals of care  - Care mangement and hospice consulted as family possibly interested is involving hospice at discharge.  - Updated hospice team who will work to connect with family. Patient likely meets hospice criteria with diagnosis of advanced dementia and FAST score 7D.  - Copy of blank POLST document given to Claudio kohli for review.  Will work to complete prior to discharge.         HPI          Beverly Cassidy is a 89 y/o woman with advanced dementia, seizure disorder, CAD, prior PE on xarelto, admitted on 10/23/2022 with acute encephalopathy and hypoxia. Course complicated by agitation requiring chemical and physical restraints.     Today, the patient was seen for:  Goals of care    Prognosis, Goals, or Advance Care Planning was addressed today with: Yes.  Mood, coping, and/or meaning in the context of serious illness were addressed today: Yes.  Summary/Comments: Met in patient room with 2 sonsNeil and Claudio. Palliative Care is specialized medical care for people with serious illness, focused on providing patients with relief from symptoms, pain and stresses of serious illness.  The goal is to improve quality of life for both the patient and the family.  Discussed trajectory of what happens with a person as dementia advances including loss of ability to walk, speak, feed self and eventually swallow.  They inquired about additional neurology input. Discussed that patient is currently not having seizures and there is likely not more for neurology to offer.   They confirm wishes for more comfort focused approached after we discussed the trajectory of dementia  progression.  Review the role of hospice program and STRESSED that being on hospice does not mean all medications are stopped.  Family would be interested in meeting with hospice while patient is here and utilizing hospice at discharge.  Reviewed role of POLST document and will work to support family in getting this completed prior to discharge.            Interval History:     Key Palliative Symptoms:  Unable to assess as unable to communicate with patient. Patient did not open eyes. Does not answer questions.  Obtained from 2 sons, Claudio and Garry.  Fatigue: Severe. Bed bound at baseline.             Review of Systems:     Unable to assess due to mental status.          Medications:     I have reviewed this patient's medication profile and medications during this hospitalization.           Physical Exam:   Temp:  [97.6  F (36.4  C)-98.8  F (37.1  C)] 98.8  F (37.1  C)  Pulse:  [] 93  Resp:  [18-20] 19  BP: (109-137)/(56-74) 122/60  SpO2:  [77 %-99 %] 92 %  FAST SCORE: 7D - cannot sit up without assistance  General appearance: fatigued and no distress  Head: Normocephalic, without obvious abnormality, atraumatic  Eyes: lids and lashes normal  Nose: no discharge, NC in place  Lungs: clear to auscultation bilaterally  Abdomen: soft, non-tender, non-distended  Extremities: no edema, cyanosis  Pulses: 2+ and symmetric  Neurologic: sleeping, eyes closed. Grimaces at times. Nonverbal.             Data Reviewed:     Pertinent Labs  Lab Results: personally reviewed.   Lab Results   Component Value Date     10/25/2022    CO2 26 10/25/2022    CO2 26 03/29/2022    BUN 13.5 10/25/2022    BUN 18 03/29/2022     Lab Results   Component Value Date    WBC 11.5 10/25/2022    HGB 12.4 10/25/2022    HCT 37.9 10/25/2022     10/25/2022     10/25/2022     AST   Date Value Ref Range Status   10/25/2022 31 10 - 35 U/L Final     ALT   Date Value Ref Range Status   10/25/2022 18 10 - 35 U/L Final     Alkaline  Phosphatase   Date Value Ref Range Status   10/25/2022 68 35 - 104 U/L Final     Albumin   Date Value Ref Range Status   10/25/2022 3.0 (L) 3.5 - 5.2 g/dL Final   2022 3.5 3.5 - 5.0 g/dL Final         Radiology Results  Echocardiogram Complete    Result Date: 10/24/2022  [id number]^COLLEEN^SKYLER^[place] [address]  Name: JOLEEN JEFFERSON MRN: [id number] : 1932 Study Date: 10/24/2022 02:43 PM Age: 89 yrs Gender: Female Patient Location: [facility] Reason For Study: Dyspnea Ordering Physician: SKYLER MACIAS Performed By: BP  BSA: 1.5 m2 Height: 62 in Weight: 120 lb HR: 107 BP: 118/66 mmHg ______________________________________________________________________________ Procedure Complete Portable Echo Adult. Definity (NDC #90093-604) given intravenously. Technically difficult study. Compared to the prior study dated 9/15/2017, there are changes as noted. ______________________________________________________________________________ Interpretation Summary  1. Left ventricular size and wall thickness are normal. Systolic function is hyperdynamic. The estimated left ventricular ejection fraction is greater than 70%. 2. Right ventricle is not well visualized. Size appears normal. Systolic function appears low normal-to-mildly reduced. 3. Moderate functional tricuspid regurgitation. 4. At least mild pulmonary hypertension is present with an estimated pulmonary artery systolic pressure of 39 mm Hg plus the right atrial pressure. 5. Compared to the prior study dated 9/15/2017, the degree of tricuspid regurgitation has increased and there is now mild pulmonary hypertension. ______________________________________________________________________________ I      WMSI = 0.00     % Normal = 0  X - Cannot   0 -                      (2) - Mildly 2 -          Segments  Size Interpret    Hyperkinetic 1 - Normal  Hypokinetic  Hypokinetic  1-2     small                                                     7 -          3-5    moderate 3 - Akinetic 4 -          5 -         6 - Akinetic Dyskinetic   6-14    large              Dyskinetic   Aneurysmal  w/scar       w/scar       15-16   diffuse  Left Ventricle The left ventricle is normal in size. There is normal left ventricular wall thickness. Hyperdynamic left ventricular function. Left ventricular diastolic function is indeterminate. The visual ejection fraction is >70%. No regional wall motion abnormalities noted. Septal motion is consistent with post- operative state.  Right Ventricle The right ventricle is not well visualized. Size appears normal. Systolic function appears low normal-to-mildly reduced.  Atria Normal left atrial size. Right atrial size is normal.  Mitral Valve The mitral valve leaflets are mildly thickened. There is mild mitral annular calcification. There is trace to mild mitral regurgitation. There is no mitral valve stenosis.  Tricuspid Valve Tricuspid valve leaflets appear normal. There is moderate (2+) tricuspid regurgitation. The right ventricular systolic pressure is elevated at 39.3 mmHg. Mild (35-45mmHg) pulmonary hypertension is present. There is no tricuspid stenosis.  Aortic Valve The aortic valve is trileaflet with aortic valve sclerosis. There is trace aortic regurgitation. No aortic stenosis is present.  Pulmonic Valve Normal pulmonic valve. There is mild (1+) pulmonic valvular regurgitation. There is no pulmonic valvular stenosis.  Vessels The aorta root is normal. The thoracic aorta cannot be assessed. Inferior vena cava not well visualized for estimation of right atrial pressure.  Pericardium There is no pericardial effusion.  Rhythm Sinus rhythm was noted.  ______________________________________________________________________________ MMode/2D Measurements & Calculations IVSd: 0.93 cm LVIDd: 3.2 cm LVIDs: 2.3 cm LVPWd: 0.99 cm FS: 28.7 %  LV mass(C)d: 85.0 grams LV mass(C)dI: 55.2 grams/m2 Ao  root diam: 2.6 cm LA dimension: 2.9 cm LA/Ao: 1.1 LVOT diam: 1.6 cm LVOT area: 2.0 cm2  EF(MOD-bp): 70.6 % LA Volume Indexed (AL/bp): 28.7 ml/m2 RWT: 0.62  Time Measurements MM HR: 107.0 BPM  Doppler Measurements & Calculations MV E max alex: 62.1 cm/sec MV A max alex: 80.1 cm/sec MV E/A: 0.78 MV max PG: 3.1 mmHg MV mean P.8 mmHg MV V2 VTI: 17.2 cm MVA(VTI): 1.9 cm2 MV P1/2t max alex: 72.9 cm/sec MV P1/2t: 91.4 msec MVA(P1/2t): 2.4 cm2 MV dec slope: 233.6 cm/sec2 MV dec time: 0.36 sec Ao V2 max: 156.9 cm/sec Ao max PG: 10.0 mmHg Ao V2 mean: 102.7 cm/sec Ao mean P.1 mmHg Ao V2 VTI: 23.1 cm DONELL(I,D): 1.4 cm2 DONELL(V,D): 1.3 cm2 LV V1 max PG: 3.8 mmHg LV V1 max: 97.9 cm/sec LV V1 VTI: 15.8 cm SV(LVOT): 32.3 ml SI(LVOT): 21.0 ml/m2 PA acc time: 0.07 sec PI end-d alex: 130.3 cm/sec TR max alex: 313.6 cm/sec TR max P.3 mmHg AV Alex Ratio (DI): 0.62 DONELL Index (cm2/m2): 0.91 E/E': 8.0 E/E' avg: 10.1 Lateral E/e': 8.0 Medial E/e': 12.3  Peak E' Alex: 7.8 cm/sec  ______________________________________________________________________________ Report approved by: Kaylen Cunningham 10/24/2022 04:23 PM       XR Chest Port 1 View    Result Date: 10/23/2022  EXAM: XR CHEST PORT 1 VIEW LOCATION: [facility] DATE/TIME: 10/23/2022 12:17 PM INDICATION: Hypoxia. COMPARISON: 2017     IMPRESSION: Postoperative cardiomediastinal silhouette is unchanged. Vascular congestion and mild edema present. Bibasilar atelectasis with no pleural effusion or pneumothorax.    Cervical spine CT w/o contrast    Result Date: 10/23/2022  EXAM: CT CERVICAL SPINE W/O CONTRAST LOCATION: [facility] DATE/TIME: 10/23/2022 12:14 PM INDICATION: Right forehead bruising. Head injury. COMPARISON: None. TECHNIQUE: Routine CT Cervical Spine without IV contrast. Multiplanar reformats. Dose reduction techniques were used. FINDINGS: Mild convex right curvature. No subluxation. Cervical vertebral body heights  preserved.  No acute cervical spine fracture. No prevertebral soft tissue swelling. Multilevel degenerative changes without evidence for high-grade spinal canal narrowing.     IMPRESSION: 1.  No acute cervical spine fracture.    CT Chest Pulmonary Embolism w Contrast    Result Date: 10/23/2022  EXAM: CT CHEST PULMONARY EMBOLISM W CONTRAST LOCATION: [facility] DATE/TIME: 10/23/2022 4:47 PM INDICATION: hypoxia, ho PE COMPARISON: None. TECHNIQUE: CT chest pulmonary angiogram during arterial phase injection of IV contrast. Multiplanar reformats and MIP reconstructions were performed. Dose reduction techniques were used. CONTRAST: Isovue 370    100ml FINDINGS: ANGIOGRAM CHEST: Pulmonary arteries are normal caliber and negative for pulmonary emboli. Thoracic aorta is negative for dissection. No CT evidence of right heart strain. LUNGS AND PLEURA: Moderate bibasilar atelectasis. Biapical pleural-parenchymal scarring and calcification. Decreased lung volumes. MEDIASTINUM/AXILLAE: No significant mediastinal or hilar adenopathy. Global mild cardiomegaly. CORONARY ARTERY CALCIFICATION: Previous intervention (stents or CABG). UPPER ABDOMEN: Cholecystectomy. Probable left hepatic lobe cyst. MUSCULOSKELETAL: Median sternotomy . T11 and T7 25% compression deformities.     IMPRESSION: 1.  No evidence of pulmonary embolus. 2.  Moderate bibasilar atelectasis.    CT Head w/o Contrast    Result Date: 10/24/2022  EXAM: CT HEAD W/O CONTRAST LOCATION: [facility] DATE/TIME: 10/24/2022 7:33 PM INDICATION: Fall, right frontal bruises, on Xarelto. COMPARISON: Head CT 10/23/2022. TECHNIQUE: Routine CT Head without IV contrast. Multiplanar reformats. Dose reduction techniques were used. FINDINGS: INTRACRANIAL CONTENTS: No intracranial hemorrhage, extraaxial collection, or mass effect.  No CT evidence of acute infarct. Moderate presumed chronic small vessel ischemic changes. Moderate generalized  volume loss. No hydrocephalus. 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 Similar appearance of abnormal left orbit dysplastic appearance of the orbital apex with widening of the superior orbital fissure and soft tissue density extending inferiorly to the pterygopalatine fossa. No paranasal sinus mucosal disease. No middle ear or mastoid effusion. BONES/SOFT TISSUES: No acute abnormality.     IMPRESSION: No acute intracranial process.    Head CT w/o contrast    Result Date: 10/23/2022  EXAM: CT HEAD WITHOUT CONTRAST LOCATION: [facility] DATE/TIME: 10/23/2022, 12:12 PM INDICATION: Bruise on head. COMPARISON: Brain MRI 01/19/2017. TECHNIQUE: Routine CT Head without IV contrast. Multiplanar reformats. Dose reduction techniques were used. FINDINGS: INTRACRANIAL CONTENTS: No intracranial hemorrhage, extra-axial collection, or mass effect.  No CT evidence of acute infarct. Mild presumed chronic small vessel ischemic changes. Moderate generalized volume loss. No hydrocephalus. VISUALIZED ORBITS/SINUSES/MASTOIDS: Enlarged right optic nerve sheath complex and left orbital intraconal mass are grossly unchanged versus 2017. 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 Mild mucosal thickening in the ethmoid and right maxillary sinuses. No middle ear or mastoid effusion. BONES/SOFT TISSUES: No acute abnormality.     IMPRESSION: 1.  No acute intracranial injury.      TTS: I have personally spent a total of 45 minutes today on unit in review of medical record, consultation with the medical providers and assessment of patient today, with more than 50% of this time spent in counseling, coordination of care, and discussion with patient and family re: prognosis, symptom management, risks and benefits of management options, and development of plan of care as noted above.    KULDEEP Castro  Angel, APRN, CNS  Palliative Care  [phone]

## 2022-10-25 NOTE — PLAN OF CARE
"  Problem: Plan of Care - These are the overarching goals to be used throughout the patient stay.    Goal: Plan of Care Review  Outcome: Progressing   Goal Outcome Evaluation:       Patient alert to self only.  Will strike out at staff with ADL's. Screams out loudly \"NO Jonathon\" when she is changed or repositioned. Keeps eyes closed. Moderate amounts of thick yellow secretions from her left eye-cleansed thoroughly. Took her medications finely crushed mixed with applesauce using a plastic spoon.  Patient sucks the food off of the plastic spoon and will spit out any  particles. Did feed patient 100% of her mash potatoes, ice-cream and vanilla pudding. Drank 120 cherry juice without difficulty. Incontinent of urine x2. Saline locked. Faint fine crackles lower lobes. Infrequent harsh cough. SAO2 93% on 4LO2 via n.c. Son's at bedside all day. Psych following.                 "

## 2022-10-26 NOTE — PROGRESS NOTES
[facility]    Medicine Progress Note - Hospitalist Service       Date of Admission:  10/23/2022    Assessment & Plan        89 year old female with past medical history of seizures, prior PE on xarelto, dementia, CAD presents to the ER with confusion.      Acute encephalopathy  - suspect toxic or metabolic encephalopathy   - underlying advanced dementia  - haldol and restraints as needed to manage behaviors  - continue home aricept  - Psychiatric consult appreciate input  - Repeat CT head as patient is on anticoagulation with head trauma  - Scheduled Seroquel 3 times daily  - 10/25 U/A: negative  - Psych consult appreciated.   - 10/26: Patient was lethargic than her baseline. Son had difficulty of waking her up and feeding the patient. Will hold day time seroquel dose today and monitor.      Acute hypoxemic respiratory failure:   - Mild, currently on 2L NC.   - Probably secondary to atelectasis, CXR with some congestion however BNP normal range.   - Recently completed course of augmentin for complaints of cough  - Unremarkable CTA chest except for bilateral moderate bibasilar atelectasis  - wean oxygen as tolerated  - Hold home diuretics as blood pressure is low and patient has poor oral intake  - 10/25: Hold IVF. Leukocytosis trending down. Procal: 0.2  - 10/26: Lasix restarted with reduced dose. Will monitor response.      Elevated troponin  -Mild, question stress response, does have history of CAD but less concern currently for ACS.   - Trop only mildly elevated at 20 with delta trop 22  - Discontinue telemetry monitoring  - echo: The estimated left ventricular ejection fraction is greater than  70%.  - Hold Imdur because of low blood pressure     History of PE:   - continue xarelto  - Negative CTA for PE     History of seizures:   - continue home keppra  - Therapeutic keppra level    Hyponatremia  - improving  - f/u BMP     Significant weakness and deconditioning  - Patient is currently  wheelchair-bound  - likely due to dementia     Advanced dementia  - Progressive deterioration as per son who is the caregiver  - Resume home Aricept  - Palliative care consult for goals of care, appreciate input  - 10/25: meeting with hospice on 10/26  - 10/26: Pt is accepted for hospice care. Hospice nurse will meet with family at their home on 10/28   Hospital Day: 4        Diet: Combination Diet Regular Diet Adult  Room Service    DVT Prophylaxis: Moderate risk. Xarelto   Mcgovern Catheter: Not present  Central Lines: None  Code Status: No CPR- Do NOT Intubate        Disposition Plan   Disposition: Home with hospice.     Expected Discharge Date: 10/27/2022,  3:00 PM  Discharge Delays: Oxygen Needs - Arrange Home O2    Discharge Comments: home with hospice - son to transport     Medically ready to discharge today: No     The patient's care was discussed with the Patient's Family.    Deanna Llanes MD  Hospitalist Service  [facility]  Securely message with the Vocera Web Console (learn more here)  Text page via [employer] Paging/Directory         Clinically Significant Risk Factors              # Hypoalbuminemia: Lowest albumin = 3 g/dL (Ref range: 3.5-5.2) at 10/25/2022  4:59 AM, will monitor as appropriate                  ____________        Physical Exam   Vital Signs: Temp: 98.8  F (37.1  C) Temp src: Axillary BP: 136/69 Pulse: 99   Resp: 18 SpO2: 96 % O2 Device: Nasal cannula Oxygen Delivery: 4 LPM  Weight: 120 lbs 0 oz  Physical Exam:  GEN: Lethargic. Not in acute distress.  HEENT: Atraumatic, mucous membrane- moist and pink.  Chest: Bilateral air entry.  CVS: S1S2 regular. No murmurs, rubs or gallops.  Abdomen: Soft. Non-tender, non-distended. No organomegaly. No guarding or rigidity. Bowel sounds active.   Extremities: No pedal edema.  CNS: No focal neurologic deficit. No involuntary movements.  Skin: No skin rash, no cyanosis or clubbing.     Data   Recent Results (from the past 24  hour(s))   Basic metabolic panel    Collection Time: 10/26/22  5:30 AM   Result Value Ref Range    Sodium 142 136 - 145 mmol/L    Potassium 4.4 3.4 - 5.3 mmol/L    Chloride 109 (H) 98 - 107 mmol/L    Carbon Dioxide (CO2) 24 22 - 29 mmol/L    Anion Gap 9 7 - 15 mmol/L    Urea Nitrogen 13.3 8.0 - 23.0 mg/dL    Creatinine 0.55 0.51 - 0.95 mg/dL    Calcium 9.2 8.2 - 9.6 mg/dL    Glucose 81 70 - 99 mg/dL    GFR Estimate 87 >60 mL/min/1.73m2   CBC with platelets and differential    Collection Time: 10/26/22  5:30 AM   Result Value Ref Range    WBC Count 11.4 (H) 4.0 - 11.0 10e3/uL    RBC Count 3.74 (L) 3.80 - 5.20 10e6/uL    Hemoglobin 12.7 11.7 - 15.7 g/dL    Hematocrit 38.7 35.0 - 47.0 %     (H) 78 - 100 fL    MCH 34.0 (H) 26.5 - 33.0 pg    MCHC 32.8 31.5 - 36.5 g/dL    RDW 12.9 10.0 - 15.0 %    Platelet Count 185 150 - 450 10e3/uL    % Neutrophils 59 %    % Lymphocytes 21 %    % Monocytes 13 %    % Eosinophils 6 %    % Basophils 1 %    % Immature Granulocytes 0 %    NRBCs per 100 WBC 0 <1 /100    Absolute Neutrophils 6.8 1.6 - 8.3 10e3/uL    Absolute Lymphocytes 2.4 0.8 - 5.3 10e3/uL    Absolute Monocytes 1.5 (H) 0.0 - 1.3 10e3/uL    Absolute Eosinophils 0.7 0.0 - 0.7 10e3/uL    Absolute Basophils 0.1 0.0 - 0.2 10e3/uL    Absolute Immature Granulocytes 0.1 <=0.4 10e3/uL    Absolute NRBCs 0.0 10e3/uL     ____________  Interval History    Patient seen and examined at bedside. Son is by the bedside. Patient was lethargic. Discussed case with Son. Will hold day time Seroquel.  Data reviewed today: I reviewed all medications, new labs and imaging results over the last 24 hours. I personally reviewed no images or EKG's today.

## 2022-10-26 NOTE — PROGRESS NOTES
Care Management Follow Up    Length of Stay (days): 3    Expected Discharge Date: 10/27/2022     Concerns to be Addressed: discharge planning       Patient plan of care discussed at interdisciplinary rounds: Yes    Anticipated Discharge Disposition: Home Care, Hospice     Anticipated Discharge Services: Other (see comment) (hospice care services)    Anticipated Discharge DME: None    Patient/family educated on Medicare website which has current facility and service quality ratings: yes    Education Provided on the Discharge Plan:  Yes    Patient/Family in Agreement with the Plan: yes    Referrals Placed by CM/ORA: Hospice    Private pay costs discussed: Not applicable    Additional Information: ORA spoke with pt's nurse Hilaria.  She stated that pt only taking very little bites of food.  Pt's son is in pt's room and seems a little nervous regarding hospice meeting planned for 10 AM today.  ORA spoke with Gwendolyn TELLEZ from [facility] after hospice meeting today.  She stated that pt's son Claudio is ready to sign pt onto hospice.  RN can come out tomorrow.  Claudio would like to take pt home tonight if doctor okays.  No DME needed.  ORA to speak with doctor and Claudio and then will call Shannan back.      ORA spoke with Dr Llanes regarding discharge plan.  He wants to keep pt until tomorrow.  Pt has been very lethargic today and has not been eating.  This may be due to all of the Seroquel she is prescribed.  He plans to hold the Seroquel and monitor pt for improvement in this regard.  Plan for potential afternoon admission tomorrow.    ORA met with pt's son Claudio to discuss discharge planning.  Noted that pt sleeping in the room during entire conversation and appeared comfortable.  ORA updated him regarding doctor keeping pt until tomorrow and why.  Claudio is in agreement with this.  He stated that pt could not even take other medication and unable to feed her because she was too drowsy after given Seroquel  earlier.  He had questions about if pt could see her other providers (heart doctor, PCP) while on hospice and who pays for that.  SW referred him back to hospice to ask these questions.  Discussed transportation options, and Claudio will transport pt home tomorrow afternoon.      ORA spoke with Hilaria again.  Pt will need home oxygen.  Pt needing 3 to 4 liters.  Hospice to arrange.      ORA spoke with Gwendolyn TELLEZ from hospice again.  Discussed plan for pt to discharge home tomorrow afternoon and that Claudio would be transporting pt.  She rescheduled time and day for hospice nurse to meet with family back at home to Friday at 10 AM.        ANN Munroe, SOCORRO 10/26/22 6:21 PM

## 2022-10-26 NOTE — PROGRESS NOTES
[facility] Palliative Care   Social Work Note    Visited with pt's son Claudio to provide emotional support. Introduced self and role on Palliative Care team. He shared story of his mom's medical journey and that he and his brother, Garry, have been caring for her for 7 years.     They have decided to enroll their mom in hospice at discharge. He feels they are making the right decision for her. Reassured him that hospice will be a wonderful support for their mom and them.     He feels he is coping well overall. He calls his sister often if he needs to talk. He is grateful for his siblings and that they are able to support each other. Beverly was resting comfortably during our visit.     Provided active listening, validation of feelings and emotional support. Assisted with processing of emotions and experiences. Plan is for pt to discharge tomorrow, so no follow up needed. Updated team.     Crystal Mathur [facility]  Palliative Care   Office # [phone]

## 2022-10-26 NOTE — PLAN OF CARE
Problem: Risk for Delirium  Goal: Improved Sleep  Outcome: Progressing     Problem: Gas Exchange Impaired  Goal: Optimal Gas Exchange  Outcome: Progressing     Problem: Plan of Care - These are the overarching goals to be used throughout the patient stay.    Goal: Absence of Hospital-Acquired Illness or Injury  Intervention: Prevent Skin Injury  Recent Flowsheet Documentation  Taken 10/26/2022 0041 by Juliana Britt RN  Body Position:    turned    left   Goal Outcome Evaluation:       Pt sleepy, moans and yells out with repositioning and jairo care, but otherwise nonverbal. Regularly removes oxygen and desaturates to 84% on RA. 92-97% on 4L NC. Turn and reposition q2h.

## 2022-10-26 NOTE — PLAN OF CARE
"Goal Outcome Evaluation:         Pt lies in bed hunched over and does not respond to questions. Pt will yell \"no\" during repositioning and pericares. Pt appears comfortable. Son assisting at bedside with with meals and medications. Pt has been repositioned every 2-3 hours. Low urine output, some oral intake and sips, meds crushed in pudding. Hospice consult completed today. Pt breathing appears unlabored, LS clear, slightly diminished. No cough assessed. Pt removes O2 tubing at times. On 2L pt O2 sats were 83-85%, increased to 3L and it took abot 2 minutes for pt to reach 91% on 3L. Pt currently on 3L. VSS. Purewick in place, no bm today. Family declined scheduled seroquel. Pt has been calm  Problem: Heart Failure Comorbidity  Goal: Maintenance of Heart Failure Symptom Control  Outcome: Progressing     Problem: Gas Exchange Impaired  Goal: Optimal Gas Exchange  Outcome: Progressing     Problem: End-of-Life Care  Goal: Comfort, Peace and Preserved Dignity  Outcome: Progressing     Problem: Plan of Care - These are the overarching goals to be used throughout the patient stay.    Goal: Absence of Hospital-Acquired Illness or Injury  Intervention: Identify and Manage Fall Risk  Recent Flowsheet Documentation  Taken 10/26/2022 0811 by Hilaria Espinoza RN  Safety Promotion/Fall Prevention:   activity supervised   patient and family education  Intervention: Prevent Skin Injury  Recent Flowsheet Documentation  Taken 10/26/2022 1200 by Hilaria Espinoza RN  Body Position:   turned   right   foot of bed elevated   position maintained   legs elevated     Problem: Restraint, Nonviolent  Goal: Absence of Harm or Injury  Intervention: Protect Skin and Joint Integrity  Recent Flowsheet Documentation  Taken 10/26/2022 1200 by Hilaria Espinoza RN  Body Position:   turned   right   foot of bed elevated   position maintained   legs elevated                  "

## 2022-10-26 NOTE — CONSULTS
Hospice:    Met with pt and son, Claudio in pt room.  Information provided regarding hospice philosophy and programming.  Claudio indicated he wants to move forward with [facility] when pt is discharged.      ORA spoke with care manager, Morenita and discharge planning will be as follows:    Once discharge approved, transportation will be set up.  In addition, medications will be ordered in a 3 day supply. Pt has all the equipment needed at home, but additional incontinence supplies will be ordered.    Admission nurse from Hospice will meet at patients home at 9:00am tomorrow morning.  ORA let  know via note and Claudio know via phone.      SIERRA Babb  [facility]  [phone]

## 2022-10-26 NOTE — CONSULTS
Hospice:  Met with Pt and her son, Claudio in pt room.    Discussed hospice and hospice philosophy.  Provided end of life education as well.    Son, Claudio has made the decision to move forward with [facility] upon discharge.     Discharge Planning is as follows:  SW contacted , Morenita. Transportation will be set for return to home.    Hospice admission nurse will meet with Pt and son at their home tomorrow morning per Fair request.  Meeting set for 9:00am.    Only equipment needed at this time is some additional incontinence supplies. Hospice will order.      In addition, 3 days of medication will be requested to be sent with pt home.      SIERRA Babb  [facility]  [phone]    *Update* Spoke with Claudio and he indicated  is keeping his mom another night.  New time for Hospice nurse to meet with family is Friday at 10:00am at their home.

## 2022-10-26 NOTE — PROGRESS NOTES
[facility]  Palliative Care Daily Progress Note      Code status: No CPR- Do NOT Intubate     Impressions, Recommendations & Counseling     SYMPTOM ASSESSMENT:  Generalized weakness secondary to advancing dementia, malnutrition  - reposition for comfort    Restlessness  -Per family request, will discontinue morning dose of Seroquel and decrease p.m. dose to 12.5 mg at bedtime while hospitalized.  Family feel this is making her more lethargic and decreasing her level of consciousness thereby minimizing her ability to eat.     Advanced Care Planning:  Advance directive: No  POLST: Recommend completing POLST.  Reviewed this in detail with kamilla Hillman in meeting today.  Discussed with Rafy that this could be completed while here at the hospital or after discharge with the hospice team.  Code status: DNR/DNI  Health care agent/surrogate: Rafy (kamilla)     Goals of care  - Care mangement and hospice consulted.  [facility] hospice met with Rafy kohli, this a.m. to provide additional information.   - Patient likely meets hospice criteria with diagnosis of advanced dementia and FAST score 7D.    ADDENDUM 1540:  POLST document completed this afternoon. Copies given to family and one in chart. Sent to honoring choices for placement in EMR.      HPI          Beverly Cassidy is a 89 y/o woman with advanced dementia, seizure disorder, CAD, prior PE on xarelto, admitted on 10/23/2022 with acute encephalopathy and hypoxia. Course complicated by agitation requiring chemical and physical restraints.     Today, the patient was seen for:  Goals of care    Prognosis, Goals, or Advance Care Planning was addressed today with: Yes.  Mood, coping, and/or meaning in the context of serious illness were addressed today: Yes.  Summary/Comments: Family meeting [phone] with patient's sonRafy.  Son plans to meet with hospice this morning.   Reviewed in detail role of POLST document and differences between selective  treatment versus comfort focused treatment in detail.  Son confirms wishes for DNR/I, no feeding tube and would be agreeable to oral antibiotics.  He wishes to discuss comfort versus selective treatment more with his siblings and revisit with palliative medicine.  May even consider completing POLST document after discharge.  Reviewed role of oxygen as someone nears end-of-life as an intervention that does not ease shortness of breath.  - Discussed that individuals on hospice can continue medications further chronic problems if they are able to swallow pills.  Hope is to prioritize and deprescribed is much as possible.  Family would be agreeable with discontinuation of statin.  - Discussed that while on hospice, individuals can continue with her primary care providers as well as specialists.  Discussed that many choose not to as they are no longer able to physically get to those appointments and more restorative and aggressive treatments are stopped as time goes on.    Spoke with Jessica Magaña (daughter) moise this morning and call interrupted by another call. Called back and message left.     1540 Returned to meet with sonRafy to see if there were any lingering questions or concerns. POLST reviewed and completed indicating wishes for DNR/I, comfort focused treatment, no feeding tube and ok with oral antibiotics. Copies thenmade, given to family and placed in chart.            Interval History:     Chart review/discussion with unit or clinical team members:   No acute distress.  Vital signs stable.  Nonverbal.  Ate some of breakfast with son assisting her today.  Sitting upright in bed hunched over with head down.  Unable to lift head.    Key Palliative Symptoms:  Unable to communicate with patient as she does not answer questions and is nonverbal.             Review of Systems:     Unable to obtain as unable to communicate with patient.          Medications:     I have reviewed this patient's medication profile  and medications during this hospitalization.           Physical Exam:   Temp:  [98.8  F (37.1  C)-99.6  F (37.6  C)] 98.8  F (37.1  C)  Pulse:  [] 103  Resp:  [18-20] 18  BP: (118-159)/(58-70) 159/70  SpO2:  [92 %-97 %] 96 %  FAST SCORE: 7D - cannot sit up without assistance  General appearance: fatigued and no distress  Head: Normocephalic, without obvious abnormality, atraumatic  Eyes: lids and lashes normal  Nose: no discharge, NC in place  Lungs: clear to auscultation bilaterally  Abdomen: soft, non-tender, non-distended  Extremities: no edema, cyanosis  Pulses: 2+ and symmetric  Neurologic: eyes closed. Nonverbal.           Data Reviewed:     Pertinent Labs  Lab Results: personally reviewed.   Lab Results   Component Value Date     10/26/2022    CO2 24 10/26/2022    CO2 26 03/29/2022    BUN 13.3 10/26/2022    BUN 18 03/29/2022     Lab Results   Component Value Date    WBC 11.4 10/26/2022    HGB 12.7 10/26/2022    HCT 38.7 10/26/2022     10/26/2022     10/26/2022     AST   Date Value Ref Range Status   10/25/2022 31 10 - 35 U/L Final     ALT   Date Value Ref Range Status   10/25/2022 18 10 - 35 U/L Final     Alkaline Phosphatase   Date Value Ref Range Status   10/25/2022 68 35 - 104 U/L Final     Albumin   Date Value Ref Range Status   10/25/2022 3.0 (L) 3.5 - 5.2 g/dL Final   03/29/2022 3.5 3.5 - 5.0 g/dL Final         TTS: I have personally spent a total of 45 minutes today on unit in review of medical record, consultation with the medical providers and assessment of patient today, with more than 50% of this time spent in counseling and discussing goals of care with patient's family re: prognosis, symptom management, risks and benefits of management options, and development of plan of care as noted above.    YU Mcleod, CNS  Palliative Care  [phone]

## 2022-10-27 NOTE — PLAN OF CARE
"  Problem: Plan of Care - These are the overarching goals to be used throughout the patient stay.    Goal: Plan of Care Review  Description: The Plan of Care Review/Shift note should be completed every shift.  The Outcome Evaluation is a brief statement about your assessment that the patient is improving, declining, or no change.  This information will be displayed automatically on your shift note.  Outcome: Adequate for Care Transition  Goal: Patient-Specific Goal (Individualized)  Description: You can add care plan individualizations to a care plan. Examples of Individualization might be:  \"Parent requests to be called daily at 9am for status\", \"I have a hard time hearing out of my right ear\", or \"Do not touch me to wake me up as it startles me\".  Outcome: Adequate for Care Transition  Goal: Absence of Hospital-Acquired Illness or Injury  Outcome: Adequate for Care Transition  Intervention: Identify and Manage Fall Risk  Recent Flowsheet Documentation  Taken 10/27/2022 0743 by Hilaria Espinoza RN  Safety Promotion/Fall Prevention: patient and family education  Goal: Optimal Comfort and Wellbeing  Outcome: Adequate for Care Transition  Goal: Readiness for Transition of Care  Outcome: Adequate for Care Transition     Problem: Restraint, Nonviolent  Goal: Absence of Harm or Injury  Outcome: Adequate for Care Transition     Problem: Risk for Delirium  Goal: Optimal Coping  Outcome: Adequate for Care Transition  Goal: Improved Behavioral Control  Outcome: Adequate for Care Transition  Intervention: Minimize Safety Risk  Recent Flowsheet Documentation  Taken 10/27/2022 0743 by Hilaria Espinoza RN  Enhanced Safety Measures: bed alarm set  Goal: Improved Attention and Thought Clarity  Outcome: Adequate for Care Transition  Goal: Improved Sleep  Outcome: Adequate for Care Transition     Problem: Heart Failure Comorbidity  Goal: Maintenance of Heart Failure Symptom Control  Outcome: Adequate for Care Transition     Problem: " Hypertension Comorbidity  Goal: Blood Pressure in Desired Range  Outcome: Adequate for Care Transition     Problem: Gas Exchange Impaired  Goal: Optimal Gas Exchange  Outcome: Adequate for Care Transition     Problem: End-of-Life Care  Goal: Comfort, Peace and Preserved Dignity  Outcome: Adequate for Care Transition   Goal Outcome Evaluation:

## 2022-10-27 NOTE — PLAN OF CARE
Patient was comfortable and slept through the night, closed her mouth tight and wiped her lips when she was offered anything to eat or drink, it took several tries for her to take the crushed mediations. Turned and repositioned.   Son called twice during the night shift and was updated.  Patient is very confused, unable to verbalize needs, unable to comprehend redirections, kept taking off her her nasal canula oxygen,  no respiratory distress noted.    Problem: Gas Exchange Impaired  Goal: Optimal Gas Exchange  Outcome: Progressing     Problem: End-of-Life Care  Goal: Comfort, Peace and Preserved Dignity  Outcome: Progressing    Problem: Risk for Delirium  Goal: Improved Attention and Thought Clarity  Outcome: Not Progressing  Intervention: Maximize Cognitive Function  Recent Flowsheet Documentation  Taken 10/27/2022 0000 by Ren Jolley RN  Sensory Stimulation Regulation: quiet environment promoted  Reorientation Measures:    familiar social contact encouraged    reorientation provided

## 2022-10-27 NOTE — DISCHARGE SUMMARY
[facility] MEDICINE  DISCHARGE SUMMARY     Primary Care Physician: Leslie Loaiza  Admission Date: 10/23/2022   Discharge Provider: Deanna Llanes MD Discharge Date: 10/27/2022   Diet:   Active Diet and Nourishment Order   Procedures     Room Service     Combination Diet Regular Diet Adult     Diet       Code Status: No CPR- Do NOT Intubate   Activity: DCACTIVITY: Activity as tolerated        Condition at Discharge: Stable     REASON FOR PRESENTATION(See Admission Note for Details)   Altered mental status    PRINCIPAL & ACTIVE DISCHARGE DIAGNOSES     Principal Problem:    Confusion  Active Problems:    Complex partial seizure disorder (H)    Hyperlipidemia    Late onset Alzheimer disease (H)    Von Recklinghausen's disease (H)    Coronary atherosclerosis    Dyslipidemia    Hypoxia    Injury of head, initial encounter    History of pulmonary embolism      PENDING LABS     Unresulted Labs Ordered in the Past 30 Days of this Admission     Date and Time Order Name Status Description    10/23/2022  2:11 PM Blood Culture Line, venous Preliminary           PROCEDURES ( this hospitalization only)          RECOMMENDATIONS TO OUTPATIENT PROVIDER FOR F/U VISIT     Follow-up Appointments     Follow-up and recommended labs and tests       Follow up with primary care provider, Leslie Loaiza, within 7 days for   hospital follow- up.  No follow up labs or test are needed. Hospice care   on 10/28/22 at home. Ok for hospice to evaluate and treat.             DISPOSITION     Home with hospice.    SUMMARY OF HOSPITAL COURSE:    89 year old female with past medical history of seizures, prior PE on xarelto, dementia, CAD presents to the ER with confusion.   Acute encephalopathy  - suspect toxic or metabolic encephalopathy   - underlying advanced dementia  - haldol and restraints as needed to manage behaviors  - continue home aricept  - Psychiatric consult appreciate input  - Repeat CT head as  patient is on anticoagulation with head trauma  - Scheduled Seroquel 3 times daily  - 10/25 U/A: negative  - Psych consult appreciated.   - 10/26: Patient was lethargic than her baseline. Son had difficulty of waking her up and feeding the patient. Will hold day time seroquel dose today and monitor.   - 10/27: Patient is more alert today. Son does not want seroquel to be prescribed as it is difficult to feed her while on it due to increased lethargy.   Acute hypoxemic respiratory failure:   - Mild, currently on 2L NC.   - Probably secondary to atelectasis, CXR with some congestion however BNP normal range.   - Recently completed course of augmentin for complaints of cough  - Unremarkable CTA chest except for bilateral moderate bibasilar atelectasis  - wean oxygen as tolerated  - Hold home diuretics as blood pressure is low and patient has poor oral intake  - 10/25: Hold IVF. Leukocytosis trending down. Procal: 0.2  - 10/26: Lasix restarted with reduced dose. Will monitor response.   - 10/27: continue lasix low dose.  Elevated troponin  -Mild, question stress response, does have history of CAD but less concern currently for ACS.   - Trop only mildly elevated at 20 with delta trop 22  - Discontinue telemetry monitoring  - echo: The estimated left ventricular ejection fraction is greater than  70%.  - Hold Imdur because of low blood pressure  History of PE:   - continue xarelto  - Negative CTA for PE  History of seizures:   - continue home keppra  - Therapeutic keppra level  Hyponatremia  - improving  - f/u BMP  Significant weakness and deconditioning  - Patient is currently wheelchair-bound  - likely due to dementia  Advanced dementia  - Progressive deterioration as per son who is the caregiver  - Resume home Aricept  - Palliative care consult for goals of care, appreciate input  - 10/25: meeting with hospice on 10/26  - 10/26: Pt is accepted for hospice care. Hospice nurse will meet with family at their home on  10/28    Patient is clinically and hemodynamically stable for discharge to home with hospice care. Medication reconciliation was done. Discussed plan of care with Son. All questions were answered. Son agreed and verbalized understanding of care.   Discharge Medications with Med changes:     Current Discharge Medication List      CONTINUE these medications which have CHANGED    Details   furosemide (LASIX) 40 MG tablet Take 0.5 tablets (20 mg) by mouth daily  Qty: 90 tablet, Refills: 4    Associated Diagnoses: Coronary artery disease due to lipid rich plaque         CONTINUE these medications which have NOT CHANGED    Details   donepezil (ARICEPT) 10 MG tablet Take 1 tablet (10 mg) by mouth daily  Qty: 30 tablet, Refills: 11    Associated Diagnoses: Late onset Alzheimer's disease without behavioral disturbance (H)      levETIRAcetam (KEPPRA) 100 MG/ML solution Take 5 mLs (500 mg) by mouth 2 times daily  Qty: 300 mL, Refills: 11    Associated Diagnoses: Partial symptomatic epilepsy with complex partial seizures, not intractable, without status epilepticus (H)      melatonin 5 MG CAPS Take 5 mg by mouth nightly as needed      metoprolol tartrate (LOPRESSOR) 25 MG tablet Take 1 tablet (25 mg) by mouth daily  Qty: 90 tablet, Refills: 4    Associated Diagnoses: Coronary artery disease due to lipid rich plaque      multivitamin CF FORMULA (CHOICEFUL) chewable tablet Take 1 tablet by mouth daily      potassium chloride ER (KLOR-CON M) 10 MEQ CR tablet Take 2 tablets (20 mEq) by mouth daily  Qty: 180 tablet, Refills: 1    Associated Diagnoses: Hypokalemia      rivaroxaban ANTICOAGULANT (XARELTO) 15 MG TABS tablet Take by mouth daily (with dinner)      rosuvastatin (CRESTOR) 40 MG tablet Take 1 tablet (40 mg) by mouth daily  Qty: 90 tablet, Refills: 4    Associated Diagnoses: Coronary artery disease due to lipid rich plaque         STOP taking these medications       isosorbide mononitrate (IMDUR) 30 MG 24 hr tablet Comments:    Reason for Stopping:                 Consults     CARE MANAGEMENT / SOCIAL WORK IP CONSULT  PHYSICAL THERAPY ADULT IP CONSULT  OCCUPATIONAL THERAPY ADULT IP CONSULT  PSYCHIATRY IP CONSULT  PALLIATIVE CARE ADULT IP CONSULT  CARE MANAGEMENT / SOCIAL WORK IP CONSULT  INPATIENT HOSPICE ADULT CONSULT  GIP INPATIENT HOSPICE ADULT CONSULT       Anticoagulation Information      Recent INR results:   Recent Labs   Lab 10/23/22  1044   INR 2.10*     Warfarin doses (if applicable) or name of other anticoagulant:       SIGNIFICANT IMAGING FINDINGS     Results for orders placed or performed during the hospital encounter of 10/23/22   Head CT w/o contrast    Impression    IMPRESSION:  1.  No acute intracranial injury.     Cervical spine CT w/o contrast    Impression    IMPRESSION:  1.  No acute cervical spine fracture.   XR Chest Port 1 View    Impression    IMPRESSION: Postoperative cardiomediastinal silhouette is unchanged. Vascular congestion and mild edema present. Bibasilar atelectasis with no pleural effusion or pneumothorax.   CT Chest Pulmonary Embolism w Contrast    Impression    IMPRESSION:  1.  No evidence of pulmonary embolus.  2.  Moderate bibasilar atelectasis.   CT Head w/o Contrast    Impression    IMPRESSION: No acute intracranial process.   Echocardiogram Complete   Result Value Ref Range    LVEF  >70%        SIGNIFICANT LABORATORY FINDINGS           Discharge Orders        Reason for your hospital stay    Altered mental status     Follow-up and recommended labs and tests     Follow up with primary care provider, Leslie Loaiza, within 7 days for hospital follow- up.  No follow up labs or test are needed. Hospice care on 10/28/22 at home. Ok for hospice to evaluate and treat.     Activity    Your activity upon discharge: activity as tolerated     Diet    Follow this diet upon discharge: Orders Placed This Encounter      Room Service      Combination Diet Regular Diet Adult       Examination   Physical Exam    Temp:  [98  F (36.7  C)-99.1  F (37.3  C)] 98  F (36.7  C)  Pulse:  [] 99  Resp:  [18-20] 18  BP: (104-162)/(56-81) 114/81  SpO2:  [90 %-94 %] 91 %  Wt Readings from Last 1 Encounters:   10/24/22 54.4 kg (120 lb)   GEN: sleepy but more responsive to voice. Not in acute distress.  HEENT: Atraumatic, mucous membrane- moist and pink.  Chest: Bilateral air entry.  CVS: S1S2 regular. No murmurs, rubs or gallops.  Abdomen: Soft. Non-tender, non-distended. No organomegaly. No guarding or rigidity. Bowel sounds active.   Extremities: No pedal edema.  CNS: No focal neurologic deficit. No involuntary movements.  Skin: No skin rash, no cyanosis or clubbing        Please see EMR for more detailed significant labs, imaging, consultant notes etc.    I, Deanna Llanes MD, personally saw the patient today and spent greater than 30 minutes discharging this patient.    Deanna Llanes MD  [facility]    CC:Leslie Loaiza

## 2022-10-27 NOTE — PROGRESS NOTES
Pt discharging home with kamilla wetzel to transport. Pt will be on hospice. Son had no questions at time of discharge. AVS reviewed with son at bedside.

## 2022-10-27 NOTE — PROGRESS NOTES
[facility] will have home O2 delivered here. SonClaudio plans on taking patient home today.Hospice will meet with family at home on 10/28 at 1000.